# Patient Record
Sex: MALE | Race: WHITE | NOT HISPANIC OR LATINO | Employment: OTHER | URBAN - METROPOLITAN AREA
[De-identification: names, ages, dates, MRNs, and addresses within clinical notes are randomized per-mention and may not be internally consistent; named-entity substitution may affect disease eponyms.]

---

## 2019-05-28 ENCOUNTER — TELEPHONE (OUTPATIENT)
Dept: FAMILY MEDICINE | Facility: CLINIC | Age: 54
End: 2019-05-28

## 2019-05-28 NOTE — TELEPHONE ENCOUNTER
----- Message from Avelino Madera sent at 5/28/2019 12:19 PM CDT -----  Contact: Pt  Pt would like to be called back regarding needing to be seen as a new patient with Dr. Jeff REESE. Pt went online to choose Doctor. Pt will need to have medication refills. Pt has [employer] and waiting insurance card 10 to 14 days.     Pt can be reached at [phone].    Thank You.

## 2019-06-11 ENCOUNTER — OFFICE VISIT (OUTPATIENT)
Dept: FAMILY MEDICINE | Facility: CLINIC | Age: 54
End: 2019-06-11
Payer: COMMERCIAL

## 2019-06-11 ENCOUNTER — LAB VISIT (OUTPATIENT)
Dept: LAB | Facility: HOSPITAL | Age: 54
End: 2019-06-11
Attending: INTERNAL MEDICINE
Payer: COMMERCIAL

## 2019-06-11 VITALS
BODY MASS INDEX: 26.58 KG/M2 | HEIGHT: 66 IN | WEIGHT: 165.38 LBS | RESPIRATION RATE: 16 BRPM | DIASTOLIC BLOOD PRESSURE: 90 MMHG | SYSTOLIC BLOOD PRESSURE: 140 MMHG | OXYGEN SATURATION: 96 % | HEART RATE: 82 BPM

## 2019-06-11 DIAGNOSIS — E78.49 OTHER HYPERLIPIDEMIA: ICD-10-CM

## 2019-06-11 DIAGNOSIS — I10 ESSENTIAL HYPERTENSION: ICD-10-CM

## 2019-06-11 DIAGNOSIS — I10 ESSENTIAL HYPERTENSION: Primary | ICD-10-CM

## 2019-06-11 DIAGNOSIS — Z11.59 ENCOUNTER FOR HEPATITIS C SCREENING TEST FOR LOW RISK PATIENT: ICD-10-CM

## 2019-06-11 DIAGNOSIS — K21.9 GASTROESOPHAGEAL REFLUX DISEASE, ESOPHAGITIS PRESENCE NOT SPECIFIED: ICD-10-CM

## 2019-06-11 LAB
BASOPHILS # BLD AUTO: 0.02 K/UL (ref 0–0.2)
BASOPHILS NFR BLD: 0.3 % (ref 0–1.9)
DIFFERENTIAL METHOD: NORMAL
EOSINOPHIL # BLD AUTO: 0.1 K/UL (ref 0–0.5)
EOSINOPHIL NFR BLD: 0.8 % (ref 0–8)
ERYTHROCYTE [DISTWIDTH] IN BLOOD BY AUTOMATED COUNT: 12.1 % (ref 11.5–14.5)
HCT VFR BLD AUTO: 42.7 % (ref 40–54)
HGB BLD-MCNC: 14.6 G/DL (ref 14–18)
IMM GRANULOCYTES # BLD AUTO: 0.02 K/UL (ref 0–0.04)
IMM GRANULOCYTES NFR BLD AUTO: 0.3 % (ref 0–0.5)
LYMPHOCYTES # BLD AUTO: 1.8 K/UL (ref 1–4.8)
LYMPHOCYTES NFR BLD: 25.5 % (ref 18–48)
MCH RBC QN AUTO: 29.9 PG (ref 27–31)
MCHC RBC AUTO-ENTMCNC: 34.2 G/DL (ref 32–36)
MCV RBC AUTO: 88 FL (ref 82–98)
MONOCYTES # BLD AUTO: 0.8 K/UL (ref 0.3–1)
MONOCYTES NFR BLD: 10.9 % (ref 4–15)
NEUTROPHILS # BLD AUTO: 4.4 K/UL (ref 1.8–7.7)
NEUTROPHILS NFR BLD: 62.2 % (ref 38–73)
NRBC BLD-RTO: 0 /100 WBC
PLATELET # BLD AUTO: 215 K/UL (ref 150–350)
PMV BLD AUTO: 10.5 FL (ref 9.2–12.9)
RBC # BLD AUTO: 4.88 M/UL (ref 4.6–6.2)
WBC # BLD AUTO: 7.13 K/UL (ref 3.9–12.7)

## 2019-06-11 PROCEDURE — 3008F PR BODY MASS INDEX (BMI) DOCUMENTED: ICD-10-PCS | Mod: CPTII,S$GLB,, | Performed by: INTERNAL MEDICINE

## 2019-06-11 PROCEDURE — 99999 PR PBB SHADOW E&M-EST. PATIENT-LVL III: CPT | Mod: PBBFAC,,, | Performed by: INTERNAL MEDICINE

## 2019-06-11 PROCEDURE — 3077F SYST BP >= 140 MM HG: CPT | Mod: CPTII,S$GLB,, | Performed by: INTERNAL MEDICINE

## 2019-06-11 PROCEDURE — 86803 HEPATITIS C AB TEST: CPT

## 2019-06-11 PROCEDURE — 99204 OFFICE O/P NEW MOD 45 MIN: CPT | Mod: S$GLB,,, | Performed by: INTERNAL MEDICINE

## 2019-06-11 PROCEDURE — 3077F PR MOST RECENT SYSTOLIC BLOOD PRESSURE >= 140 MM HG: ICD-10-PCS | Mod: CPTII,S$GLB,, | Performed by: INTERNAL MEDICINE

## 2019-06-11 PROCEDURE — 84443 ASSAY THYROID STIM HORMONE: CPT

## 2019-06-11 PROCEDURE — 99204 PR OFFICE/OUTPT VISIT, NEW, LEVL IV, 45-59 MIN: ICD-10-PCS | Mod: S$GLB,,, | Performed by: INTERNAL MEDICINE

## 2019-06-11 PROCEDURE — 80061 LIPID PANEL: CPT

## 2019-06-11 PROCEDURE — 36415 COLL VENOUS BLD VENIPUNCTURE: CPT | Mod: PO

## 2019-06-11 PROCEDURE — 3078F PR MOST RECENT DIASTOLIC BLOOD PRESSURE < 80 MM HG: ICD-10-PCS | Mod: CPTII,S$GLB,, | Performed by: INTERNAL MEDICINE

## 2019-06-11 PROCEDURE — 85025 COMPLETE CBC W/AUTO DIFF WBC: CPT

## 2019-06-11 PROCEDURE — 3078F DIAST BP <80 MM HG: CPT | Mod: CPTII,S$GLB,, | Performed by: INTERNAL MEDICINE

## 2019-06-11 PROCEDURE — 3008F BODY MASS INDEX DOCD: CPT | Mod: CPTII,S$GLB,, | Performed by: INTERNAL MEDICINE

## 2019-06-11 PROCEDURE — 80053 COMPREHEN METABOLIC PANEL: CPT

## 2019-06-11 PROCEDURE — 99999 PR PBB SHADOW E&M-EST. PATIENT-LVL III: ICD-10-PCS | Mod: PBBFAC,,, | Performed by: INTERNAL MEDICINE

## 2019-06-11 RX ORDER — OMEPRAZOLE 20 MG/1
20 CAPSULE, DELAYED RELEASE ORAL DAILY
COMMUNITY
End: 2019-12-30

## 2019-06-11 RX ORDER — PREGABALIN 150 MG/1
150 CAPSULE ORAL 2 TIMES DAILY
COMMUNITY

## 2019-06-11 RX ORDER — LISINOPRIL 10 MG/1
10 TABLET ORAL DAILY
COMMUNITY
End: 2019-06-11

## 2019-06-11 RX ORDER — LISINOPRIL 20 MG/1
20 TABLET ORAL DAILY
Qty: 90 TABLET | Refills: 3 | Status: SHIPPED | OUTPATIENT
Start: 2019-06-11 | End: 2020-05-25

## 2019-06-11 RX ORDER — ATORVASTATIN CALCIUM 10 MG/1
10 TABLET, FILM COATED ORAL DAILY
Qty: 90 TABLET | Refills: 1 | Status: SHIPPED | OUTPATIENT
Start: 2019-06-11 | End: 2019-12-01 | Stop reason: SDUPTHER

## 2019-06-11 RX ORDER — ATORVASTATIN CALCIUM 10 MG/1
10 TABLET, FILM COATED ORAL DAILY
COMMUNITY
End: 2019-06-11 | Stop reason: SDUPTHER

## 2019-06-11 RX ORDER — TRAMADOL HYDROCHLORIDE 100 MG/1
100 TABLET, EXTENDED RELEASE ORAL DAILY
COMMUNITY

## 2019-06-11 NOTE — PROGRESS NOTES
Patient ID: Maury Mullins is a 54 y.o. male.    Chief Complaint: Establish Care (Patient here to establish care)    HPI  From [place]. Is going through divorce. His sister lives in the area. Living in [place]. 2 grown children. He was working in [employer] business in the past. Is disabled 2/2 back injury s/p surg.     PMH:  Hypertension, hyperlipidemia, GERD, chronic low back pain.   Surg Hx: 2 back surg  FHx: dad has pace maker  Social Hx: no tobacco (previous), no etoh, no illicits    HTN:   Lisinopril 10 mg qd  No CP or JORGENSEN  BP elevated x 2 today   Will increase to 20 mg   Nurse visit in 2 week  Bmp in 2 weeks    HLD:   Atorvastatin 10 mg qd  Check lipids    GERD:   Omeprazole 20 mg qd.   Well controlled.  Continue    Chronic low back with bilateral sciatica:  Tramadol 100 mg qd  pregabalin 150 mg BID  Pain is 5-6/10 today.   Had 2 surgeries (2016 and 2018) both fusions.   No improvement in pain after surg. Sees Dr. Hernandez.   Good control with oral meds.   Monitor      Social History     Socioeconomic History    Marital status: Legally      Spouse name: Not on file    Number of children: Not on file    Years of education: Not on file    Highest education level: Not on file   Occupational History    Not on file   Social Needs    Financial resource strain: Not on file    Food insecurity:     Worry: Not on file     Inability: Not on file    Transportation needs:     Medical: Not on file     Non-medical: Not on file   Tobacco Use    Smoking status: Former Smoker     Last attempt to quit: 1980     Years since quittin.0    Smokeless tobacco: Never Used   Substance and Sexual Activity    Alcohol use: Never     Frequency: Never    Drug use: Never    Sexual activity: Yes     Partners: Female   Lifestyle    Physical activity:     Days per week: Not on file     Minutes per session: Not on file    Stress: Not on file   Relationships    Social connections:     Talks on phone: Not  on file     Gets together: Not on file     Attends Yazidi service: Not on file     Active member of club or organization: Not on file     Attends meetings of clubs or organizations: Not on file     Relationship status: Not on file   Other Topics Concern    Not on file   Social History Narrative    Not on file     No family history on file.  Current Outpatient Medications on File Prior to Visit   Medication Sig Dispense Refill    atorvastatin (LIPITOR) 10 MG tablet Take 10 mg by mouth once daily.      omeprazole (PRILOSEC) 20 MG capsule Take 20 mg by mouth once daily.      pregabalin (LYRICA) 150 MG capsule Take 150 mg by mouth 2 (two) times daily.      traMADol (ULTRAM-ER) 100 MG Tb24 Take 100 mg by mouth once daily.      [DISCONTINUED] lisinopril 10 MG tablet Take 10 mg by mouth once daily.       No current facility-administered medications on file prior to visit.      I personally reviewed past medical, family and social history.  Review of Systems   Constitutional: Negative for activity change, fever and unexpected weight change.   HENT: Negative for facial swelling, hearing loss and trouble swallowing.    Eyes: Negative for visual disturbance.   Respiratory: Negative for cough, chest tightness, shortness of breath and wheezing.    Cardiovascular: Negative for chest pain, palpitations and leg swelling.   Gastrointestinal: Negative for abdominal pain, blood in stool, constipation, diarrhea, nausea and vomiting.   Endocrine: Negative for cold intolerance, polydipsia, polyphagia and polyuria.   Genitourinary: Negative for decreased urine volume and dysuria.   Musculoskeletal: Negative for gait problem and neck pain.   Skin: Negative for rash.   Neurological: Negative for dizziness, syncope and light-headedness.   Psychiatric/Behavioral: Negative for dysphoric mood. The patient is not nervous/anxious.        Objective:     Vitals:    06/11/19 1511   BP: (!) 148/82   Pulse: 82   Resp: 16        Physical Exam    Constitutional: He is oriented to person, place, and time. He appears well-developed and well-nourished. No distress.   HENT:   Head: Normocephalic and atraumatic.   Eyes: Pupils are equal, round, and reactive to light. EOM are normal. Right eye exhibits no discharge. Left eye exhibits no discharge. No scleral icterus.   Neck: Normal range of motion. Neck supple. No JVD present. No thyromegaly present.   Cardiovascular: Normal rate, regular rhythm and normal heart sounds. Exam reveals no gallop and no friction rub.   No murmur heard.  Pulmonary/Chest: Effort normal and breath sounds normal. No respiratory distress. He has no wheezes.   Abdominal: Soft. Bowel sounds are normal. He exhibits no distension and no mass. There is no tenderness.   Musculoskeletal: Normal range of motion. He exhibits no edema.   Lymphadenopathy:     He has no cervical adenopathy.   Neurological: He is alert and oriented to person, place, and time. No cranial nerve deficit. Coordination normal.   Skin: Skin is warm and dry. Capillary refill takes less than 2 seconds. No rash noted. He is not diaphoretic.   Psychiatric: He has a normal mood and affect. His behavior is normal.       Assessment/Plan   Maury was seen today for establish care.    Diagnoses and all orders for this visit:    Encounter for hepatitis C screening test for low risk patient  -     Hepatitis C antibody; Future    Essential hypertension  -     Basic metabolic panel; Future  -     Comprehensive metabolic panel; Future  -     TSH; Future  -     CBC auto differential; Future    Other hyperlipidemia  -     Lipid panel; Future    Gastroesophageal reflux disease, esophagitis presence not specified    Other orders  -     lisinopril (PRINIVIL,ZESTRIL) 20 MG tablet; Take 1 tablet (20 mg total) by mouth once daily.

## 2019-06-12 LAB
ALBUMIN SERPL BCP-MCNC: 4.3 G/DL (ref 3.5–5.2)
ALP SERPL-CCNC: 105 U/L (ref 55–135)
ALT SERPL W/O P-5'-P-CCNC: 17 U/L (ref 10–44)
ANION GAP SERPL CALC-SCNC: 15 MMOL/L (ref 8–16)
AST SERPL-CCNC: 19 U/L (ref 10–40)
BILIRUB SERPL-MCNC: 0.7 MG/DL (ref 0.1–1)
BUN SERPL-MCNC: 13 MG/DL (ref 6–20)
CALCIUM SERPL-MCNC: 10.2 MG/DL (ref 8.7–10.5)
CHLORIDE SERPL-SCNC: 101 MMOL/L (ref 95–110)
CHOLEST SERPL-MCNC: 150 MG/DL (ref 120–199)
CHOLEST/HDLC SERPL: 3.1 {RATIO} (ref 2–5)
CO2 SERPL-SCNC: 24 MMOL/L (ref 23–29)
CREAT SERPL-MCNC: 0.9 MG/DL (ref 0.5–1.4)
EST. GFR  (AFRICAN AMERICAN): >60 ML/MIN/1.73 M^2
EST. GFR  (NON AFRICAN AMERICAN): >60 ML/MIN/1.73 M^2
GLUCOSE SERPL-MCNC: 107 MG/DL (ref 70–110)
HCV AB SERPL QL IA: NEGATIVE
HDLC SERPL-MCNC: 49 MG/DL (ref 40–75)
HDLC SERPL: 32.7 % (ref 20–50)
LDLC SERPL CALC-MCNC: 73.6 MG/DL (ref 63–159)
NONHDLC SERPL-MCNC: 101 MG/DL
POTASSIUM SERPL-SCNC: 3.9 MMOL/L (ref 3.5–5.1)
PROT SERPL-MCNC: 7 G/DL (ref 6–8.4)
SODIUM SERPL-SCNC: 140 MMOL/L (ref 136–145)
TRIGL SERPL-MCNC: 137 MG/DL (ref 30–150)
TSH SERPL DL<=0.005 MIU/L-ACNC: 0.78 UIU/ML (ref 0.4–4)

## 2019-06-25 ENCOUNTER — CLINICAL SUPPORT (OUTPATIENT)
Dept: FAMILY MEDICINE | Facility: CLINIC | Age: 54
End: 2019-06-25
Payer: COMMERCIAL

## 2019-06-25 ENCOUNTER — TELEPHONE (OUTPATIENT)
Dept: FAMILY MEDICINE | Facility: CLINIC | Age: 54
End: 2019-06-25

## 2019-06-25 ENCOUNTER — LAB VISIT (OUTPATIENT)
Dept: LAB | Facility: HOSPITAL | Age: 54
End: 2019-06-25
Attending: INTERNAL MEDICINE
Payer: COMMERCIAL

## 2019-06-25 VITALS — DIASTOLIC BLOOD PRESSURE: 60 MMHG | SYSTOLIC BLOOD PRESSURE: 110 MMHG

## 2019-06-25 DIAGNOSIS — Z01.30 BLOOD PRESSURE CHECK: Primary | ICD-10-CM

## 2019-06-25 DIAGNOSIS — I10 ESSENTIAL HYPERTENSION: ICD-10-CM

## 2019-06-25 LAB
ANION GAP SERPL CALC-SCNC: 8 MMOL/L (ref 8–16)
BUN SERPL-MCNC: 15 MG/DL (ref 6–20)
CALCIUM SERPL-MCNC: 10.2 MG/DL (ref 8.7–10.5)
CHLORIDE SERPL-SCNC: 102 MMOL/L (ref 95–110)
CO2 SERPL-SCNC: 29 MMOL/L (ref 23–29)
CREAT SERPL-MCNC: 0.9 MG/DL (ref 0.5–1.4)
EST. GFR  (AFRICAN AMERICAN): >60 ML/MIN/1.73 M^2
EST. GFR  (NON AFRICAN AMERICAN): >60 ML/MIN/1.73 M^2
GLUCOSE SERPL-MCNC: 77 MG/DL (ref 70–110)
POTASSIUM SERPL-SCNC: 4 MMOL/L (ref 3.5–5.1)
SODIUM SERPL-SCNC: 139 MMOL/L (ref 136–145)

## 2019-06-25 PROCEDURE — 99499 NO LOS: ICD-10-PCS | Mod: S$GLB,,, | Performed by: INTERNAL MEDICINE

## 2019-06-25 PROCEDURE — 36415 COLL VENOUS BLD VENIPUNCTURE: CPT | Mod: PO

## 2019-06-25 PROCEDURE — 80048 BASIC METABOLIC PNL TOTAL CA: CPT

## 2019-06-25 PROCEDURE — 99499 UNLISTED E&M SERVICE: CPT | Mod: S$GLB,,, | Performed by: INTERNAL MEDICINE

## 2019-06-25 NOTE — TELEPHONE ENCOUNTER
Blood pressure improved on increased lisinopril.  Continue current medication.  I would Follow-up BMP done today.

## 2019-06-25 NOTE — PROGRESS NOTES
Maury Mullins 54 y.o. male is here today for Blood Pressure check.   History of HTN yes.    Review of patient's allergies indicates:  No Known Allergies  Creatinine   Date Value Ref Range Status   06/11/2019 0.9 0.5 - 1.4 mg/dL Final     Sodium   Date Value Ref Range Status   06/11/2019 140 136 - 145 mmol/L Final     Potassium   Date Value Ref Range Status   06/11/2019 3.9 3.5 - 5.1 mmol/L Final   ]  Patient verifies taking blood pressure medications on a regular basis at the same time of the day.     Current Outpatient Medications:     atorvastatin (LIPITOR) 10 MG tablet, Take 1 tablet (10 mg total) by mouth once daily., Disp: 90 tablet, Rfl: 1    lisinopril (PRINIVIL,ZESTRIL) 20 MG tablet, Take 1 tablet (20 mg total) by mouth once daily., Disp: 90 tablet, Rfl: 3    omeprazole (PRILOSEC) 20 MG capsule, Take 20 mg by mouth once daily., Disp: , Rfl:     pregabalin (LYRICA) 150 MG capsule, Take 150 mg by mouth 2 (two) times daily., Disp: , Rfl:     traMADol (ULTRAM-ER) 100 MG Tb24, Take 100 mg by mouth once daily., Disp: , Rfl:   Does patient have record of home blood pressure readings no. Readings have been averaging NA.   Last dose of blood pressure medication was taken at AM  Patient is asymptomatic.   Complains of NA.    BP: 110/60 ,   .    Blood pressure reading after 15 minutes was 110/60 pulse NA  Dr. Ruggiero  notified.

## 2019-06-26 ENCOUNTER — PATIENT OUTREACH (OUTPATIENT)
Dept: ADMINISTRATIVE | Facility: HOSPITAL | Age: 54
End: 2019-06-26

## 2019-06-26 NOTE — PROGRESS NOTES
Health Maintenance Due   Topic Date Due    TETANUS VACCINE  04/24/1981    Shingles Vaccine (1 of 2) 04/24/2013    Colonoscopy  04/24/2013     Chart review completed 06/26/2019  Not in Links 06/26/2019  Pre-visit outreach via mail

## 2019-06-26 NOTE — LETTER
June 26, 2019    Maury Mullins  [address] [facility] [address] [facility] [place] [address]             [facility]  [address]  [facility] [address]  Phone: [phone] Dear Mr. Mullins:    [facility] is committed to your overall health.  To help you get the most out of each of your visits, we will review your information to make sure you are up to date on all of your recommended tests and/or procedures.      Dr. Ruggiero   has found that your chart shows you may be due for the following:    Tetanus Immunization  Shingles Immunization  Colon cancer screening    If you have had any of the above done at another facility, please bring the records with you or Fax them to [phone] so that your record at [facility] will be complete. If you have not had any of these tests or procedures done recently and would like to complete this testing ,  please call [phone] or send a message through your [facility] portal to your provider's office.     If you have an upcoming scheduled appointment for the above test and/or procedures, please disregard this letter.    If you are currently taking medication, please bring it with you to your appointment for review.    Sincerely,    Dixie Chinchilla, Care Coordinator  [facility]  Phone: [phone]  Fax: [phone]

## 2019-07-03 ENCOUNTER — TELEPHONE (OUTPATIENT)
Dept: ADMINISTRATIVE | Facility: HOSPITAL | Age: 54
End: 2019-07-03

## 2019-07-03 NOTE — TELEPHONE ENCOUNTER
----- Message from Janelle Arroyo sent at 7/1/2019  1:43 PM CDT -----  Contact: pt  Pt calling about a letter he received in the mail would like a call back,please to discuss it cause she states her has already had the colon screening in 2016...[phone] (home)

## 2019-07-03 NOTE — TELEPHONE ENCOUNTER
Patient states had cscope in [place] and records were given to Dr. Ruggiero.  Please send this report to [facility] team to scan into chart.

## 2019-07-11 ENCOUNTER — LAB VISIT (OUTPATIENT)
Dept: LAB | Facility: HOSPITAL | Age: 54
End: 2019-07-11
Attending: INTERNAL MEDICINE
Payer: COMMERCIAL

## 2019-07-11 ENCOUNTER — OFFICE VISIT (OUTPATIENT)
Dept: FAMILY MEDICINE | Facility: CLINIC | Age: 54
End: 2019-07-11
Payer: COMMERCIAL

## 2019-07-11 VITALS
HEIGHT: 66 IN | SYSTOLIC BLOOD PRESSURE: 120 MMHG | BODY MASS INDEX: 27.25 KG/M2 | DIASTOLIC BLOOD PRESSURE: 78 MMHG | OXYGEN SATURATION: 98 % | WEIGHT: 169.56 LBS | HEART RATE: 79 BPM

## 2019-07-11 DIAGNOSIS — I10 ESSENTIAL HYPERTENSION: Primary | ICD-10-CM

## 2019-07-11 DIAGNOSIS — K21.9 GASTROESOPHAGEAL REFLUX DISEASE, ESOPHAGITIS PRESENCE NOT SPECIFIED: ICD-10-CM

## 2019-07-11 DIAGNOSIS — Z12.5 SCREENING FOR PROSTATE CANCER: ICD-10-CM

## 2019-07-11 DIAGNOSIS — Z23 NEED FOR SHINGLES VACCINE: ICD-10-CM

## 2019-07-11 DIAGNOSIS — E78.49 OTHER HYPERLIPIDEMIA: ICD-10-CM

## 2019-07-11 PROCEDURE — 3074F PR MOST RECENT SYSTOLIC BLOOD PRESSURE < 130 MM HG: ICD-10-PCS | Mod: CPTII,S$GLB,, | Performed by: INTERNAL MEDICINE

## 2019-07-11 PROCEDURE — 3078F PR MOST RECENT DIASTOLIC BLOOD PRESSURE < 80 MM HG: ICD-10-PCS | Mod: CPTII,S$GLB,, | Performed by: INTERNAL MEDICINE

## 2019-07-11 PROCEDURE — 99999 PR PBB SHADOW E&M-EST. PATIENT-LVL III: ICD-10-PCS | Mod: PBBFAC,,, | Performed by: INTERNAL MEDICINE

## 2019-07-11 PROCEDURE — 3008F PR BODY MASS INDEX (BMI) DOCUMENTED: ICD-10-PCS | Mod: CPTII,S$GLB,, | Performed by: INTERNAL MEDICINE

## 2019-07-11 PROCEDURE — 99999 PR PBB SHADOW E&M-EST. PATIENT-LVL III: CPT | Mod: PBBFAC,,, | Performed by: INTERNAL MEDICINE

## 2019-07-11 PROCEDURE — 99214 PR OFFICE/OUTPT VISIT, EST, LEVL IV, 30-39 MIN: ICD-10-PCS | Mod: S$GLB,,, | Performed by: INTERNAL MEDICINE

## 2019-07-11 PROCEDURE — 36415 COLL VENOUS BLD VENIPUNCTURE: CPT | Mod: PO

## 2019-07-11 PROCEDURE — 3008F BODY MASS INDEX DOCD: CPT | Mod: CPTII,S$GLB,, | Performed by: INTERNAL MEDICINE

## 2019-07-11 PROCEDURE — 3078F DIAST BP <80 MM HG: CPT | Mod: CPTII,S$GLB,, | Performed by: INTERNAL MEDICINE

## 2019-07-11 PROCEDURE — 3074F SYST BP LT 130 MM HG: CPT | Mod: CPTII,S$GLB,, | Performed by: INTERNAL MEDICINE

## 2019-07-11 PROCEDURE — 84153 ASSAY OF PSA TOTAL: CPT

## 2019-07-11 PROCEDURE — 99214 OFFICE O/P EST MOD 30 MIN: CPT | Mod: S$GLB,,, | Performed by: INTERNAL MEDICINE

## 2019-07-11 NOTE — PROGRESS NOTES
Subjective:       Patient ID: Maury Mullins is a 54 y.o. male.    Chief Complaint: Hypertension    HPI     From [place]. Is going through divorce. His sister lives in the area. Living in [place]. 2 grown children. 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      PMH:  Hypertension, hyperlipidemia, GERD, chronic low back pain.     Establish care on 06/11/2019.  On that visit ordered routine labs.  Increase lisinopril to 20 mg.  BMP, hepatitis-C, CBC, lipid panel, TSH all normal.  He is here today for follow-up. He is feeling well overall. Has had increased back pain but is going for neck and back injections soon.  He is requesting shingles vaccine.    HTN:   Lisinopril 20 mg qd  No CP or JORGENSEN  Blood pressure at goal today     HLD:   Atorvastatin 10 mg qd  Lipids up-to-date and at goal.  Liver function normal on CMP.     GERD:   Omeprazole 20 mg qd.  No dysphagia   Well controlled.  Continue     Chronic low back with bilateral sciatica:  Had 2 surgeries (2016 and 2018) both fusions.   Tramadol 100 mg qd  pregabalin 150 mg BID  Pain is 7/10 today. States he is going for back and neck injections soon.   No improvement in pain after surg. Sees Dr. Hernandez.   Monitor    Discuss PSA in 6 mo   Needs repeat CSC in 2021.   Current Outpatient Medications on File Prior to Visit   Medication Sig Dispense Refill    atorvastatin (LIPITOR) 10 MG tablet Take 1 tablet (10 mg total) by mouth once daily. 90 tablet 1    lisinopril (PRINIVIL,ZESTRIL) 20 MG tablet Take 1 tablet (20 mg total) by mouth once daily. 90 tablet 3    omeprazole (PRILOSEC) 20 MG capsule Take 20 mg by mouth once daily.      pregabalin (LYRICA) 150 MG capsule Take 150 mg by mouth 2 (two) times daily.      traMADol (ULTRAM-ER) 100 MG Tb24 Take 100 mg by mouth once daily.       No current facility-administered medications on file prior to visit.      I personally reviewed past medical, family and social  history.  Review of Systems   Constitutional: Negative for activity change, fever and unexpected weight change.   HENT: Negative for facial swelling, hearing loss and trouble swallowing.    Eyes: Negative for visual disturbance.   Respiratory: Negative for cough, chest tightness, shortness of breath and wheezing.    Cardiovascular: Negative for chest pain, palpitations and leg swelling.   Gastrointestinal: Negative for abdominal pain, blood in stool, constipation, diarrhea, nausea and vomiting.   Endocrine: Negative for cold intolerance, polydipsia, polyphagia and polyuria.   Genitourinary: Negative for decreased urine volume and dysuria.   Musculoskeletal: Positive for back pain. Negative for gait problem and neck pain.   Skin: Negative for rash.   Neurological: Negative for dizziness, syncope and light-headedness.   Psychiatric/Behavioral: Negative for dysphoric mood. The patient is not nervous/anxious.        Objective:     Vitals:    07/11/19 0942   BP: 120/78   Pulse: 79        Physical Exam   Constitutional: He is oriented to person, place, and time. He appears well-developed and well-nourished. No distress.   HENT:   Head: Normocephalic and atraumatic.   Eyes: Pupils are equal, round, and reactive to light. EOM are normal. Right eye exhibits no discharge. Left eye exhibits no discharge. No scleral icterus.   Neck: Normal range of motion. Neck supple. No JVD present. No thyromegaly present.   Cardiovascular: Normal rate, regular rhythm and normal heart sounds. Exam reveals no gallop and no friction rub.   No murmur heard.  Pulmonary/Chest: Effort normal and breath sounds normal. No respiratory distress. He has no wheezes.   Abdominal: Soft. Bowel sounds are normal. He exhibits no distension and no mass. There is no tenderness.   Musculoskeletal: Normal range of motion. He exhibits no edema.   Lymphadenopathy:     He has no cervical adenopathy.   Neurological: He is alert and oriented to person, place, and  time. No cranial nerve deficit. Coordination normal.   Skin: Skin is warm and dry. Capillary refill takes less than 2 seconds. No rash noted. He is not diaphoretic.   Psychiatric: He has a normal mood and affect. His behavior is normal.         Lab Results   Component Value Date    WBC 7.13 06/11/2019    HGB 14.6 06/11/2019    HCT 42.7 06/11/2019    MCV 88 06/11/2019     06/11/2019      CMP  Sodium   Date Value Ref Range Status   06/25/2019 139 136 - 145 mmol/L Final     Potassium   Date Value Ref Range Status   06/25/2019 4.0 3.5 - 5.1 mmol/L Final     Chloride   Date Value Ref Range Status   06/25/2019 102 95 - 110 mmol/L Final     CO2   Date Value Ref Range Status   06/25/2019 29 23 - 29 mmol/L Final     Glucose   Date Value Ref Range Status   06/25/2019 77 70 - 110 mg/dL Final     BUN, Bld   Date Value Ref Range Status   06/25/2019 15 6 - 20 mg/dL Final     Creatinine   Date Value Ref Range Status   06/25/2019 0.9 0.5 - 1.4 mg/dL Final     Calcium   Date Value Ref Range Status   06/25/2019 10.2 8.7 - 10.5 mg/dL Final     Total Protein   Date Value Ref Range Status   06/11/2019 7.0 6.0 - 8.4 g/dL Final     Albumin   Date Value Ref Range Status   06/11/2019 4.3 3.5 - 5.2 g/dL Final     Total Bilirubin   Date Value Ref Range Status   06/11/2019 0.7 0.1 - 1.0 mg/dL Final     Comment:     For infants and newborns, interpretation of results should be based  on gestational age, weight and in agreement with clinical  observations.  Premature Infant recommended reference ranges:  Up to 24 hours.............<8.0 mg/dL  Up to 48 hours............<12.0 mg/dL  3-5 days..................<15.0 mg/dL  6-29 days.................<15.0 mg/dL       Alkaline Phosphatase   Date Value Ref Range Status   06/11/2019 105 55 - 135 U/L Final     AST   Date Value Ref Range Status   06/11/2019 19 10 - 40 U/L Final     ALT   Date Value Ref Range Status   06/11/2019 17 10 - 44 U/L Final     Anion Gap   Date Value Ref Range Status    06/25/2019 8 8 - 16 mmol/L Final     eGFR if    Date Value Ref Range Status   06/25/2019 >60.0 >60 mL/min/1.73 m^2 Final     eGFR if non    Date Value Ref Range Status   06/25/2019 >60.0 >60 mL/min/1.73 m^2 Final     Comment:     Calculation used to obtain the estimated glomerular filtration  rate (eGFR) is the CKD-EPI equation.         Lab Results   Component Value Date    CHOL 150 06/11/2019     Lab Results   Component Value Date    HDL 49 06/11/2019     Lab Results   Component Value Date    LDLCALC 73.6 06/11/2019     Lab Results   Component Value Date    TRIG 137 06/11/2019     Lab Results   Component Value Date    CHOLHDL 32.7 06/11/2019      Lab Results   Component Value Date    TSH 0.783 06/11/2019       Assessment/Plan   Maury was seen today for hypertension.    Diagnoses and all orders for this visit:    Essential hypertension  -     CBC auto differential; Future  -     Comprehensive metabolic panel; Future    Other hyperlipidemia  -     Lipid panel; Future    Gastroesophageal reflux disease, esophagitis presence not specified    Need for shingles vaccine  -     Cancel: (In Office Administered) Zoster Recombinant Vaccine    Screening for prostate cancer  -     PSA, Screening; Future

## 2019-07-12 LAB — COMPLEXED PSA SERPL-MCNC: 0.34 NG/ML (ref 0–4)

## 2019-09-13 ENCOUNTER — OFFICE VISIT (OUTPATIENT)
Dept: FAMILY MEDICINE | Facility: CLINIC | Age: 54
End: 2019-09-13
Payer: COMMERCIAL

## 2019-09-13 VITALS
OXYGEN SATURATION: 97 % | HEIGHT: 66 IN | SYSTOLIC BLOOD PRESSURE: 126 MMHG | HEART RATE: 84 BPM | BODY MASS INDEX: 27.7 KG/M2 | WEIGHT: 172.38 LBS | DIASTOLIC BLOOD PRESSURE: 82 MMHG

## 2019-09-13 DIAGNOSIS — K21.9 GASTROESOPHAGEAL REFLUX DISEASE, ESOPHAGITIS PRESENCE NOT SPECIFIED: ICD-10-CM

## 2019-09-13 DIAGNOSIS — I10 ESSENTIAL HYPERTENSION: Primary | ICD-10-CM

## 2019-09-13 PROCEDURE — 99213 OFFICE O/P EST LOW 20 MIN: CPT | Mod: S$GLB,,, | Performed by: INTERNAL MEDICINE

## 2019-09-13 PROCEDURE — 3074F PR MOST RECENT SYSTOLIC BLOOD PRESSURE < 130 MM HG: ICD-10-PCS | Mod: CPTII,S$GLB,, | Performed by: INTERNAL MEDICINE

## 2019-09-13 PROCEDURE — 99999 PR PBB SHADOW E&M-EST. PATIENT-LVL III: ICD-10-PCS | Mod: PBBFAC,,, | Performed by: INTERNAL MEDICINE

## 2019-09-13 PROCEDURE — 3074F SYST BP LT 130 MM HG: CPT | Mod: CPTII,S$GLB,, | Performed by: INTERNAL MEDICINE

## 2019-09-13 PROCEDURE — 3008F PR BODY MASS INDEX (BMI) DOCUMENTED: ICD-10-PCS | Mod: CPTII,S$GLB,, | Performed by: INTERNAL MEDICINE

## 2019-09-13 PROCEDURE — 3079F DIAST BP 80-89 MM HG: CPT | Mod: CPTII,S$GLB,, | Performed by: INTERNAL MEDICINE

## 2019-09-13 PROCEDURE — 99213 PR OFFICE/OUTPT VISIT, EST, LEVL III, 20-29 MIN: ICD-10-PCS | Mod: S$GLB,,, | Performed by: INTERNAL MEDICINE

## 2019-09-13 PROCEDURE — 3008F BODY MASS INDEX DOCD: CPT | Mod: CPTII,S$GLB,, | Performed by: INTERNAL MEDICINE

## 2019-09-13 PROCEDURE — 99999 PR PBB SHADOW E&M-EST. PATIENT-LVL III: CPT | Mod: PBBFAC,,, | Performed by: INTERNAL MEDICINE

## 2019-09-13 PROCEDURE — 3079F PR MOST RECENT DIASTOLIC BLOOD PRESSURE 80-89 MM HG: ICD-10-PCS | Mod: CPTII,S$GLB,, | Performed by: INTERNAL MEDICINE

## 2019-09-13 RX ORDER — CYCLOBENZAPRINE HCL 5 MG
TABLET ORAL
COMMUNITY
Start: 2019-09-01

## 2019-09-13 NOTE — PROGRESS NOTES
Subjective:       Patient ID: Maury Mullins is a 54 y.o. male.    Chief Complaint: Sore Throat    HPI    From [place]. Is going through divorce. His sister lives in the area. Living in [place]. 2 grown children. 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     Has been having sore throat. Got back from [place] last week. Sxs started on the trip. Took throat lozenges with some relief. No fevers. Occasional dry cough. Is better today. Tramadol is helping with the pain. Taking tramadol 100 mg daily. Still sees Dr. Hernandez for pain. Last injection was in the beginning of August. BP is well controlled on current lisinopril. GERD is stable omeprazole. He is wanting to get est with GI here. Lipids stable on atorvastatin.      PMH:  Hypertension, hyperlipidemia, GERD, chronic low back pain.       HTN:   Lisinopril   No CP or JORGENSEN  Blood pressure at goal today     HLD:   Atorvastatin   Lipids up-to-date and at goal.  Liver function normal on CMP.     GERD:   Omeprazole       Chronic low back with bilateral sciatica:  Had 2 surgeries (2016 and 2018) both fusions.   Tramadol 100 mg qd  pregabalin 150 mg BID  Pain is 7/10 today. States he is going for back and neck injections soon.   No improvement in pain after surg. Sees Dr. Hernandez.   Monitor  Current Outpatient Medications on File Prior to Visit   Medication Sig Dispense Refill    atorvastatin (LIPITOR) 10 MG tablet Take 1 tablet (10 mg total) by mouth once daily. 90 tablet 1    cyclobenzaprine (FLEXERIL) 5 MG tablet       lisinopril (PRINIVIL,ZESTRIL) 20 MG tablet Take 1 tablet (20 mg total) by mouth once daily. 90 tablet 3    omeprazole (PRILOSEC) 20 MG capsule Take 20 mg by mouth once daily.      pregabalin (LYRICA) 150 MG capsule Take 150 mg by mouth 2 (two) times daily.      traMADol (ULTRAM-ER) 100 MG Tb24 Take 100 mg by mouth once daily.       No current facility-administered medications on file prior to  visit.      I personally reviewed past medical, family and social history.  Review of Systems   Constitutional: Negative for activity change and fever.   HENT: Positive for sore throat. Negative for trouble swallowing.    Eyes: Negative for pain and visual disturbance.   Respiratory: Negative for cough, shortness of breath and wheezing.    Cardiovascular: Negative for chest pain, palpitations and leg swelling.   Gastrointestinal: Negative for abdominal pain, blood in stool, diarrhea, nausea and vomiting.   Endocrine: Negative for cold intolerance and polyuria.   Genitourinary: Negative for decreased urine volume and dysuria.   Musculoskeletal: Negative for gait problem and neck pain.   Skin: Negative for rash.   Neurological: Negative for dizziness, syncope and light-headedness.   Psychiatric/Behavioral: Negative for dysphoric mood. The patient is not nervous/anxious.        Objective:     Vitals:    09/13/19 1117   BP: 126/82   Pulse: 84        Physical Exam   Constitutional: He is oriented to person, place, and time. He appears well-developed and well-nourished. No distress.   HENT:   Head: Normocephalic and atraumatic.   Eyes: Pupils are equal, round, and reactive to light. EOM are normal. Right eye exhibits no discharge. Left eye exhibits no discharge. No scleral icterus.   Neck: Normal range of motion. Neck supple. No JVD present. No thyromegaly present.   Cardiovascular: Normal rate, regular rhythm and normal heart sounds. Exam reveals no gallop and no friction rub.   No murmur heard.  Pulmonary/Chest: Effort normal and breath sounds normal. No respiratory distress. He has no wheezes.   Abdominal: Soft. Bowel sounds are normal. He exhibits no distension and no mass. There is no tenderness.   Musculoskeletal: Normal range of motion. He exhibits no edema.   Lymphadenopathy:     He has no cervical adenopathy.   Neurological: He is alert and oriented to person, place, and time. No cranial nerve deficit.  Coordination normal.   Skin: Skin is warm and dry. Capillary refill takes less than 2 seconds. No rash noted. He is not diaphoretic.   Psychiatric: He has a normal mood and affect. His behavior is normal.         Assessment/Plan   Maury was seen today for sore throat.    Diagnoses and all orders for this visit:    Essential hypertension    Gastroesophageal reflux disease, esophagitis presence not specified  -     Ambulatory referral to Gastroenterology

## 2019-10-11 ENCOUNTER — TELEPHONE (OUTPATIENT)
Dept: FAMILY MEDICINE | Facility: CLINIC | Age: 54
End: 2019-10-11

## 2019-10-11 NOTE — TELEPHONE ENCOUNTER
----- Message from Cirilo Ruggiero DO sent at 10/11/2019  5:06 PM CDT -----  Contact: CECY   Please call patient to see what we need to do, to get his records.   ----- Message -----  From: Antonio Desir MD  Sent: 10/11/2019   1:18 PM CDT  To: Cirilo Ruggiero, DO    I think this message is for you.     ----- Message -----  From: Melinda Hernández  Sent: 10/11/2019   9:13 AM CDT  To: Antonio Desir MD    Please call patient he has been trying to get his medical records since may.

## 2019-12-02 RX ORDER — ATORVASTATIN CALCIUM 10 MG/1
TABLET, FILM COATED ORAL
Qty: 90 TABLET | Refills: 1 | Status: SHIPPED | OUTPATIENT
Start: 2019-12-02 | End: 2020-05-25

## 2019-12-30 ENCOUNTER — LAB VISIT (OUTPATIENT)
Dept: LAB | Facility: HOSPITAL | Age: 54
End: 2019-12-30
Attending: NURSE PRACTITIONER
Payer: COMMERCIAL

## 2019-12-30 ENCOUNTER — OFFICE VISIT (OUTPATIENT)
Dept: FAMILY MEDICINE | Facility: CLINIC | Age: 54
End: 2019-12-30
Payer: COMMERCIAL

## 2019-12-30 ENCOUNTER — OFFICE VISIT (OUTPATIENT)
Dept: GASTROENTEROLOGY | Facility: CLINIC | Age: 54
End: 2019-12-30
Payer: COMMERCIAL

## 2019-12-30 VITALS
HEART RATE: 78 BPM | WEIGHT: 181.88 LBS | HEIGHT: 66 IN | RESPIRATION RATE: 16 BRPM | BODY MASS INDEX: 29.23 KG/M2 | SYSTOLIC BLOOD PRESSURE: 137 MMHG | DIASTOLIC BLOOD PRESSURE: 88 MMHG

## 2019-12-30 VITALS
DIASTOLIC BLOOD PRESSURE: 88 MMHG | OXYGEN SATURATION: 97 % | BODY MASS INDEX: 29.27 KG/M2 | HEART RATE: 88 BPM | HEIGHT: 66 IN | WEIGHT: 182.13 LBS | SYSTOLIC BLOOD PRESSURE: 132 MMHG

## 2019-12-30 DIAGNOSIS — Z80.0 FAMILY HISTORY OF COLON CANCER: ICD-10-CM

## 2019-12-30 DIAGNOSIS — R10.10 UPPER ABDOMINAL PAIN: ICD-10-CM

## 2019-12-30 DIAGNOSIS — Z86.010 HISTORY OF COLON POLYPS: ICD-10-CM

## 2019-12-30 DIAGNOSIS — G89.29 CHRONIC LOW BACK PAIN WITH BILATERAL SCIATICA, UNSPECIFIED BACK PAIN LATERALITY: ICD-10-CM

## 2019-12-30 DIAGNOSIS — K62.89 RECTAL PAIN: ICD-10-CM

## 2019-12-30 DIAGNOSIS — E78.49 OTHER HYPERLIPIDEMIA: ICD-10-CM

## 2019-12-30 DIAGNOSIS — Z87.19 HISTORY OF HEMORRHOIDS: ICD-10-CM

## 2019-12-30 DIAGNOSIS — R12 HEARTBURN: Primary | ICD-10-CM

## 2019-12-30 DIAGNOSIS — M54.41 CHRONIC LOW BACK PAIN WITH BILATERAL SCIATICA, UNSPECIFIED BACK PAIN LATERALITY: ICD-10-CM

## 2019-12-30 DIAGNOSIS — M54.42 CHRONIC LOW BACK PAIN WITH BILATERAL SCIATICA, UNSPECIFIED BACK PAIN LATERALITY: ICD-10-CM

## 2019-12-30 DIAGNOSIS — I10 ESSENTIAL HYPERTENSION: Primary | ICD-10-CM

## 2019-12-30 LAB
ALBUMIN SERPL BCP-MCNC: 4.3 G/DL (ref 3.5–5.2)
ALP SERPL-CCNC: 101 U/L (ref 55–135)
ALT SERPL W/O P-5'-P-CCNC: 27 U/L (ref 10–44)
AST SERPL-CCNC: 24 U/L (ref 10–40)
BILIRUB DIRECT SERPL-MCNC: 0.4 MG/DL (ref 0.1–0.3)
BILIRUB SERPL-MCNC: 0.9 MG/DL (ref 0.1–1)
ERYTHROCYTE [DISTWIDTH] IN BLOOD BY AUTOMATED COUNT: 11.4 % (ref 11.5–14.5)
HCT VFR BLD AUTO: 42.6 % (ref 40–54)
HGB BLD-MCNC: 14.4 G/DL (ref 14–18)
LIPASE SERPL-CCNC: 36 U/L (ref 4–60)
MCH RBC QN AUTO: 29.2 PG (ref 27–31)
MCHC RBC AUTO-ENTMCNC: 33.8 G/DL (ref 32–36)
MCV RBC AUTO: 86 FL (ref 82–98)
PLATELET # BLD AUTO: 196 K/UL (ref 150–350)
PMV BLD AUTO: 10.3 FL (ref 9.2–12.9)
PROT SERPL-MCNC: 7 G/DL (ref 6–8.4)
RBC # BLD AUTO: 4.93 M/UL (ref 4.6–6.2)
WBC # BLD AUTO: 6.09 K/UL (ref 3.9–12.7)

## 2019-12-30 PROCEDURE — 83690 ASSAY OF LIPASE: CPT

## 2019-12-30 PROCEDURE — 3079F PR MOST RECENT DIASTOLIC BLOOD PRESSURE 80-89 MM HG: ICD-10-PCS | Mod: CPTII,S$GLB,, | Performed by: INTERNAL MEDICINE

## 2019-12-30 PROCEDURE — 3008F BODY MASS INDEX DOCD: CPT | Mod: CPTII,S$GLB,, | Performed by: INTERNAL MEDICINE

## 2019-12-30 PROCEDURE — 3008F PR BODY MASS INDEX (BMI) DOCUMENTED: ICD-10-PCS | Mod: CPTII,S$GLB,, | Performed by: NURSE PRACTITIONER

## 2019-12-30 PROCEDURE — 99999 PR PBB SHADOW E&M-EST. PATIENT-LVL III: ICD-10-PCS | Mod: PBBFAC,,, | Performed by: INTERNAL MEDICINE

## 2019-12-30 PROCEDURE — 99214 OFFICE O/P EST MOD 30 MIN: CPT | Mod: S$GLB,,, | Performed by: INTERNAL MEDICINE

## 2019-12-30 PROCEDURE — 3075F SYST BP GE 130 - 139MM HG: CPT | Mod: CPTII,S$GLB,, | Performed by: NURSE PRACTITIONER

## 2019-12-30 PROCEDURE — 85027 COMPLETE CBC AUTOMATED: CPT

## 2019-12-30 PROCEDURE — 99204 OFFICE O/P NEW MOD 45 MIN: CPT | Mod: S$GLB,,, | Performed by: NURSE PRACTITIONER

## 2019-12-30 PROCEDURE — 3008F BODY MASS INDEX DOCD: CPT | Mod: CPTII,S$GLB,, | Performed by: NURSE PRACTITIONER

## 2019-12-30 PROCEDURE — 86677 HELICOBACTER PYLORI ANTIBODY: CPT

## 2019-12-30 PROCEDURE — 3075F SYST BP GE 130 - 139MM HG: CPT | Mod: CPTII,S$GLB,, | Performed by: INTERNAL MEDICINE

## 2019-12-30 PROCEDURE — 3075F PR MOST RECENT SYSTOLIC BLOOD PRESS GE 130-139MM HG: ICD-10-PCS | Mod: CPTII,S$GLB,, | Performed by: INTERNAL MEDICINE

## 2019-12-30 PROCEDURE — 99999 PR PBB SHADOW E&M-EST. PATIENT-LVL V: CPT | Mod: PBBFAC,,, | Performed by: NURSE PRACTITIONER

## 2019-12-30 PROCEDURE — 99999 PR PBB SHADOW E&M-EST. PATIENT-LVL III: CPT | Mod: PBBFAC,,, | Performed by: INTERNAL MEDICINE

## 2019-12-30 PROCEDURE — 80076 HEPATIC FUNCTION PANEL: CPT

## 2019-12-30 PROCEDURE — 99214 PR OFFICE/OUTPT VISIT, EST, LEVL IV, 30-39 MIN: ICD-10-PCS | Mod: S$GLB,,, | Performed by: INTERNAL MEDICINE

## 2019-12-30 PROCEDURE — 36415 COLL VENOUS BLD VENIPUNCTURE: CPT | Mod: PO

## 2019-12-30 PROCEDURE — 3075F PR MOST RECENT SYSTOLIC BLOOD PRESS GE 130-139MM HG: ICD-10-PCS | Mod: CPTII,S$GLB,, | Performed by: NURSE PRACTITIONER

## 2019-12-30 PROCEDURE — 3079F DIAST BP 80-89 MM HG: CPT | Mod: CPTII,S$GLB,, | Performed by: NURSE PRACTITIONER

## 2019-12-30 PROCEDURE — 99999 PR PBB SHADOW E&M-EST. PATIENT-LVL V: ICD-10-PCS | Mod: PBBFAC,,, | Performed by: NURSE PRACTITIONER

## 2019-12-30 PROCEDURE — 3008F PR BODY MASS INDEX (BMI) DOCUMENTED: ICD-10-PCS | Mod: CPTII,S$GLB,, | Performed by: INTERNAL MEDICINE

## 2019-12-30 PROCEDURE — 3079F PR MOST RECENT DIASTOLIC BLOOD PRESSURE 80-89 MM HG: ICD-10-PCS | Mod: CPTII,S$GLB,, | Performed by: NURSE PRACTITIONER

## 2019-12-30 PROCEDURE — 99204 PR OFFICE/OUTPT VISIT, NEW, LEVL IV, 45-59 MIN: ICD-10-PCS | Mod: S$GLB,,, | Performed by: NURSE PRACTITIONER

## 2019-12-30 PROCEDURE — 3079F DIAST BP 80-89 MM HG: CPT | Mod: CPTII,S$GLB,, | Performed by: INTERNAL MEDICINE

## 2019-12-30 RX ORDER — OMEPRAZOLE 40 MG/1
40 CAPSULE, DELAYED RELEASE ORAL EVERY MORNING
Qty: 30 CAPSULE | Refills: 3 | Status: SHIPPED | OUTPATIENT
Start: 2019-12-30 | End: 2020-03-23

## 2019-12-30 RX ORDER — HYDROCORTISONE ACETATE 25 MG/1
25 SUPPOSITORY RECTAL 2 TIMES DAILY PRN
Qty: 24 SUPPOSITORY | Refills: 2 | Status: SHIPPED | OUTPATIENT
Start: 2019-12-30 | End: 2020-01-09

## 2019-12-30 NOTE — PROGRESS NOTES
Subjective:       Patient ID: Maury Mullins is a 54 y.o. male Body mass index is 29.36 kg/m².    Chief Complaint: Gastroesophageal Reflux    This patient is new to me.     Patient reports he moved from [place] in 5/2019 and is here for medication refill for omeprazole. Patient brought cd that contains his prior medical records, which is over 100 pages.    Gastroesophageal Reflux   He complains of heartburn (recently flared up). He reports no abdominal pain, no belching, no chest pain, no choking, no coughing, no dysphagia, no hoarse voice, no nausea or no sore throat. This is a chronic problem. The problem has been gradually worsening (over the past week). The symptoms are aggravated by certain foods (greasy/fried foods). Pertinent negatives include no fatigue, melena or weight loss. Risk factors include smoking/tobacco exposure (former smoker). He has tried a PPI and a diet change (omeprazole 20 mg once daily, zantac 300 mg nightly prn breakthrough symptoms) for the symptoms. Past procedures include an EGD.     Review of Systems   Constitutional: Negative for appetite change, chills, fatigue, fever and weight loss.   HENT: Negative for hoarse voice, sore throat and trouble swallowing.    Respiratory: Negative for cough, choking and shortness of breath.    Cardiovascular: Negative for chest pain.   Gastrointestinal: Positive for constipation (chronic constipation; bowel movements once daily with some straining at times; benefiber once daily restarted recently), heartburn (recently flared up) and rectal pain (had rectal pain in the past and had hernia repair; saw GI in the past for it and told had hemorrhoids; recurred last week; relieved with bowel movement, described as pressure). Negative for abdominal pain, anal bleeding, blood in stool, diarrhea, dysphagia, melena, nausea and vomiting.   Genitourinary: Negative for difficulty urinating, dysuria and flank pain.   Neurological: Negative for weakness.      "  Past Medical History:   Diagnosis Date    Chronic lower back pain     Chronic neck pain     Colon polyp     GERD (gastroesophageal reflux disease)     HTN (hypertension)     Hyperlipidemia     Irritable bowel syndrome      Past Surgical History:   Procedure Laterality Date    COLONOSCOPY  08/15/2016    Dr. Bundy in [place], sent for scanning: hemorrhoids, 3 mm sessile colon polyp, biopsy: tubular adeoma; repeat in 5 years for surveillance    COLONOSCOPY  05/25/2011    Dr. Bundy, sent for scanning: hemorrhoids, random biopsiesl biopsy: negative for microscopic colitis    COLONOSCOPY  05/27/2009    Dr. Bundy, sent for scanning    ESOPHAGEAL MANOMETRY  09/03/2013    "normal Dr. High"    INGUINAL HERNIA REPAIR Right ~2009    UPPER GASTROINTESTINAL ENDOSCOPY  03/09/2016    Dr. Bundy, sent for scanning: mild distal esophageal erythema, biopsy: mild reflux, negative for h pylori, llanos's and celiac    UPPER GASTROINTESTINAL ENDOSCOPY  05/27/2015    Dr. Bundy, sent for scanning    UPPER GASTROINTESTINAL ENDOSCOPY  07/09/2012    Dr. Bundy, sent for scanning    UPPER GASTROINTESTINAL ENDOSCOPY  06/30/2010    Dr. Bundy, sent for scanning     Family History   Problem Relation Age of Onset    Colon cancer Father 90    Colon cancer Maternal Cousin 36    Crohn's disease Neg Hx     Esophageal cancer Neg Hx     Stomach cancer Neg Hx     Ulcerative colitis Neg Hx      Wt Readings from Last 10 Encounters:   12/30/19 82.5 kg (181 lb 14.1 oz)   12/30/19 82.6 kg (182 lb 1.6 oz)   09/13/19 78.2 kg (172 lb 6.4 oz)   07/11/19 76.9 kg (169 lb 8.5 oz)   06/11/19 75 kg (165 lb 5.5 oz)     Lab Results   Component Value Date    WBC 7.13 06/11/2019    HGB 14.6 06/11/2019    HCT 42.7 06/11/2019    MCV 88 06/11/2019     06/11/2019     CMP  Sodium   Date Value Ref Range Status   06/25/2019 139 136 - 145 mmol/L Final     Potassium   Date Value Ref Range Status   06/25/2019 4.0 3.5 - 5.1 mmol/L " Final     Chloride   Date Value Ref Range Status   06/25/2019 102 95 - 110 mmol/L Final     CO2   Date Value Ref Range Status   06/25/2019 29 23 - 29 mmol/L Final     Glucose   Date Value Ref Range Status   06/25/2019 77 70 - 110 mg/dL Final     BUN, Bld   Date Value Ref Range Status   06/25/2019 15 6 - 20 mg/dL Final     Creatinine   Date Value Ref Range Status   06/25/2019 0.9 0.5 - 1.4 mg/dL Final     Calcium   Date Value Ref Range Status   06/25/2019 10.2 8.7 - 10.5 mg/dL Final     Total Protein   Date Value Ref Range Status   06/11/2019 7.0 6.0 - 8.4 g/dL Final     Albumin   Date Value Ref Range Status   06/11/2019 4.3 3.5 - 5.2 g/dL Final     Total Bilirubin   Date Value Ref Range Status   06/11/2019 0.7 0.1 - 1.0 mg/dL Final     Comment:     For infants and newborns, interpretation of results should be based  on gestational age, weight and in agreement with clinical  observations.  Premature Infant recommended reference ranges:  Up to 24 hours.............<8.0 mg/dL  Up to 48 hours............<12.0 mg/dL  3-5 days..................<15.0 mg/dL  6-29 days.................<15.0 mg/dL       Alkaline Phosphatase   Date Value Ref Range Status   06/11/2019 105 55 - 135 U/L Final     AST   Date Value Ref Range Status   06/11/2019 19 10 - 40 U/L Final     ALT   Date Value Ref Range Status   06/11/2019 17 10 - 44 U/L Final     Anion Gap   Date Value Ref Range Status   06/25/2019 8 8 - 16 mmol/L Final     eGFR if    Date Value Ref Range Status   06/25/2019 >60.0 >60 mL/min/1.73 m^2 Final     eGFR if non    Date Value Ref Range Status   06/25/2019 >60.0 >60 mL/min/1.73 m^2 Final     Comment:     Calculation used to obtain the estimated glomerular filtration  rate (eGFR) is the CKD-EPI equation.        Lab Results   Component Value Date    TSH 0.783 06/11/2019     Reviewed medical records received from patient, summarized below and in medical & surgical history (endoscopies, etc), & given  "to nurse to be scanned into system:   2/12/19, 4/25/17, 6/29/16, 2/23/16, 2/10/16, 5/5/15, 10/20/14. 12/27/13, 9/13/13, 8/16/13, 7/26/13, 7/9/13, 5/2/13, 1/3/13, 8/3/12, 7/3/12, 6/2/11, 5/17/11, 1/28/11, 7/9/10, 7/9/10, 5/28/10, 10/23/09 visit notes (PAST TREATMENTS: protonix, dexilant, colace, Citrucel, amitriptyline, analpram-hc)  Colonoscopy, esophageal manometry, and EGD reports  3/1/16 CT "a few small renal cysts otherwise unremarkable" "impression: no evidence of acute abdominal or pelvic abnormality. No lymphoadenopathy, masses or inflamed bowel segments. A few probable small renal cysts"  5/6/11 ct chest abdomen pelvis "impression: findings suspicious for mild sigmoid colitis. 1.8 cm probable lymphocele in region of prior left inguinal hernia repair nonobstructing 2 mm right renal calculus. 4 mm right middle lob pulmonary nodule"  Blood work- see records for full results    Objective:      Physical Exam   Constitutional: He is oriented to person, place, and time. He appears well-developed and well-nourished. No distress.   HENT:   Mouth/Throat: Oropharynx is clear and moist and mucous membranes are normal. No oral lesions. No oropharyngeal exudate.   Eyes: Pupils are equal, round, and reactive to light. Conjunctivae are normal. No scleral icterus.   Pulmonary/Chest: Effort normal and breath sounds normal. No respiratory distress. He has no wheezes.   Abdominal: Soft. Normal appearance and bowel sounds are normal. He exhibits no distension, no abdominal bruit and no mass. There is tenderness (mild) in the right upper quadrant, epigastric area and left upper quadrant. There is no rigidity, no rebound, no guarding, no tenderness at McBurney's point and negative Vides's sign.   Patient declined rectal exam.   Neurological: He is alert and oriented to person, place, and time.   Skin: Skin is warm and dry. No rash noted. He is not diaphoretic. No erythema. No pallor.   Non-jaundiced   Psychiatric: He has a normal " mood and affect. His behavior is normal. Judgment and thought content normal.   Nursing note and vitals reviewed.      Assessment:       1. Heartburn    2. Upper abdominal pain    3. Rectal pain    4. History of hemorrhoids    5. History of colon polyps    6. Family history of colon cancer        Plan:       Heartburn  -  INCREASE TO   omeprazole (PRILOSEC) 40 MG capsule; Take 1 capsule (40 mg total) by mouth every morning.  Dispense: 30 capsule; Refill: 3  - CAN CONTINUE ZANTAC 300 MG DAILY PRN BREAKTHROUGH HEARTBURN  - schedule EGD, discussed procedure with patient, including risks and benefits, patient verbalized understanding  - avoid/minimize use of NSAIDs- since they can cause GI upset, bleeding and/or ulcers. If NSAID must be taken, recommend take with food.    Upper abdominal pain  -     US Abdomen Complete; Future; Expected date: 12/30/2019  -     Lipase; Future; Expected date: 12/30/2019  -     H.Pylori Antibody IgG; Future; Expected date: 12/30/2019  -     Hepatic function panel; Future; Expected date: 01/30/2020  -     CBC Without Differential; Future; Expected date: 12/30/2019  - schedule EGD, discussed procedure with patient, including risks and benefits, patient verbalized understanding    Rectal pain  - RESTART    hydrocortisone (ANUSOL-HC) 25 mg suppository; Place 1 suppository (25 mg total) rectally 2 (two) times daily as needed for Hemorrhoids.  Dispense: 24 suppository; Refill: 2  -     Ambulatory referral to General Surgery  - Discussed about possible diagnostic colonoscopy if symptoms persist; patient verbalized understanding and patient agreed with management plan    History of hemorrhoids  - RESTART    hydrocortisone (ANUSOL-HC) 25 mg suppository; Place 1 suppository (25 mg total) rectally 2 (two) times daily as needed for Hemorrhoids.  Dispense: 24 suppository; Refill: 2  -     Ambulatory referral to General Surgery  - avoid constipation and straining with bowel movements; try using an OTC  stool softener as directed and increase fiber in diet (20-30 grams daily) & CONTINUE OTC fiber supplement, BENEFIBER (take as directed)  - recommend SITZ baths    History of colon polyps & Family history of colon cancer  - next surveillance colonoscopy due 8/2021    Follow up in about 1 month (around 1/30/2020), or if symptoms worsen or fail to improve.      If no improvement in symptoms or symptoms worsen, call/follow-up at clinic or go to ER.

## 2019-12-30 NOTE — PATIENT INSTRUCTIONS
"GERD (Adult)    The esophagus is a tube that carries food from the mouth to the stomach. A valve at the lower end of the esophagus prevents stomach acid from flowing upward. When this valve doesn't work properly, stomach contents may repeatedly flow back up (reflux) into the esophagus. This is called gastroesophageal reflux disease (GERD). GERD can irritate the esophagus. It can cause problems with swallowing or breathing. In severe cases, GERD can cause recurrent pneumonia or other serious problems.  Symptoms of reflux include burning, pressure or sharp pain in the upper abdomen or mid to lower chest. The pain can spread to the neck, back, or shoulder. There may be belching, an acid taste in the back of the throat, chronic cough, or sore throat or hoarseness. GERD symptoms often occur during the day after a big meal. They can also occur at night when lying down.   Home care  Lifestyle changes can help reduce symptoms. If needed, medicines may be prescribed. Symptoms often improve with treatment, but if treatment is stopped, the symptoms often return after a few months. So most persons with GERD will need to continue treatment.  Lifestyle changes  · Limit or avoid fatty, fried, and spicy foods, as well as coffee, chocolate, mint, and foods with high acid content such as tomatoes and citrus fruit and juices (orange, grapefruit, lemon).  · Dont eat large meals, especially at night. Frequent, smaller meals are best. Do not lie down right after eating. And dont eat anything 3 hours before going to bed.  · Avoid drinking alcohol and smoking. As much as possible, stay away from second hand smoke.  · If you are overweight, losing weight will reduce symptoms.   · Avoid wearing tight clothing around your stomach area.  · If your symptoms occur during sleep, use a foam wedge to elevate your upper body (not just your head.) Or, place 4" blocks under the head of your bed.  Medicines  If needed, medicines can help relieve the " symptoms of GERD and prevent damage to the esophagus. Discuss a medicine plan with your healthcare provider. This may include one or more of the following medicines:  · Antacids to help neutralize the normal acids in your stomach.  · Acid blockers (H2 blockers) to decrease acid production.  · Acid inhibitors (PPIs) to decrease acid production in a different way than the blockers. They may work better, but can take a little longer to take effect.  Take an antacid 30-60 minutes after eating and at bedtime, but not at the same time as an acid blocker.  Try not to take medicines such as ibuprofen and aspirin. If you are taking aspirin for your heart or other medical reasons, talk to your healthcare provider about stopping it.  Follow-up care  Follow up with your healthcare provider or as advised by our staff.  When to seek medical advice  Call your healthcare provider if any of the following occur:  · Stomach pain gets worse or moves to the lower right abdomen (appendix area)  · Chest pain appears or gets worse, or spreads to the back, neck, shoulder, or arm  · Frequent vomiting (cant keep down liquids)  · Blood in the stool or vomit (red or black in color)  · Feeling weak or dizzy  · Fever of 100.4ºF (38ºC) or higher, or as directed by your healthcare provider  Date Last Reviewed: 6/23/2015 © 2000-2017 [email or web address]. [address] [place] [address], [place] [address]. All rights reserved. This information is not intended as a substitute for professional medical care. Always follow your healthcare professional's instructions.            Hemorrhoids    Hemorrhoids are swollen and inflamed veins inside the rectum and near the anus. The rectum is the last several inches of the colon. The anus is the passage between the rectum and the outside of the body.  Causes  The veins can become swollen due to increased pressure in them. This is most often caused by:  · Chronic constipation or diarrhea  · Straining when having a  bowel movement  · Sitting too long on the toilet  · A low-fiber diet  · Pregnancy  Symptoms  · Bleeding from the rectum (this may be noticeable after bowel movements)  · Lump near the anus  · Itching around the anus  · Pain around the anus  There are different types of hemorrhoids. Depending on the type you have and the severity, you may be able to treat yourself at home. In some cases, a procedure may be the best treatment option. Your healthcare provider can tell you more about this, if needed.  Home care  General care  · To get relief from pain or itching, try:  ? Topical products. Your healthcare provider may prescribe or recommend creams, ointments, or pads that can be applied to the hemorrhoid. Use these exactly as directed.  ? Medicines. Your healthcare provider may recommend stool softeners, suppositories, or laxatives to help manage constipation. Use these exactly as directed.  ? Sitz baths. A sitz bath involves sitting in a few inches of warm bath water. Be careful not to make the water so hot that you burn yourself--test it before sitting in it. Soak for about 10 to 15 minutes a few times a day. This may help relieve pain.  Tips to help prevent hemorrhoids  · Eat more fiber. Fiber adds bulk to stool and absorbs water as it moves through your colon. This makes stool softer and easier to pass.  ? Increase the fiber in your diet with more fiber-rich foods. These include fresh fruit, vegetables, and whole grains.  ? Take a fiber supplement or bulking agent, if advised to by your provider. These include products such as psyllium or methylcellulose.  · Drink plenty of water, if directed to by your provider. This can help keep stool soft.  · Be more active. Frequent exercise aids digestion and helps prevent constipation. It may also help make bowel movements more regular.  · Dont strain during bowel movements. This can make hemorrhoids more likely. Also, dont sit on the toilet for long periods of  time.  Follow-up care  Follow up with your healthcare provider, or as advised. If a culture or imaging tests were done, you will be notified of the results when they are ready. This may take a few days or longer.  When to seek medical advice  Call your healthcare provider right away if any of these occur:  · Increased bleeding from the rectum  · Increased pain around the rectum or anus  · Weakness or dizziness  Call 911  Call 911 or return to the emergency department right away if any of these occur:  · Trouble breathing or swallowing  · Fainting or loss of consciousness  · Unusually fast heart rate  · Vomiting blood  · Large amounts of blood in stool  Date Last Reviewed: 6/22/2015  © 1928-4034 "[employer]. [address] [place] [address], [place] [address]. All rights reserved. This information is not intended as a substitute for professional medical care. Always follow your healthcare professional's instructions.

## 2019-12-30 NOTE — Clinical Note
Please inform patient that I reviewed medical records and recommend to continue with current management plan and recommend seeing general surgery for further evaluation and management of hemorrhoids (referral placed). Next surveillance colonoscopy is due 8/2021.Thanks,JASIEL

## 2019-12-30 NOTE — PROGRESS NOTES
Subjective:       Patient ID: Maury Mullins is a 54 y.o. male.    Chief Complaint: Hypertension and Rectal Pain    HPI    From [place]. Is going through divorce. His sister lives in the area. Living in [place]. 2 grown children. 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     Here today for follow-up. Has been having rectal pain and tenesmus. Denies blood in stool, States he had this in the past. Colonoscopy showed internal hemorrhoids per patient. He is seeing GI today for GERD. States next CSC is 2021. He is back to taking benifiber with some result. States stool is very hard at times (isabella at times). States colace helped in the past. He would like to have prostate checked. KARL: prostate slightly enlarged but symmetric, no overt masses.   -continue bene fiber.   -trial colace nightly.     Past medical history includes:  1.  Hypertension:  Lisinopril. Stable.   2.  Hyperlipidemia:  Atorvastatin. Stable.   3.  GERD:  Omeprazole. Uncontrolled, discussing with GI today.   4.  Chronic low back pain with bilateral sciatica:  Tramadol and pregabalin. Had 2 surgeries (2016 and 2018) both fusions.  Sees pain management Dr. Hernandez. Yasmeen.   5.  Screening for colon cancer:  Last colonoscopy 01/01/2016.  Next due 2021.     -lipid, CMP, CBC ordered for 01/11/2020  Current Outpatient Medications on File Prior to Visit   Medication Sig Dispense Refill    atorvastatin (LIPITOR) 10 MG tablet TAKE 1 TABLET BY MOUTH EVERY DAY 90 tablet 1    cyclobenzaprine (FLEXERIL) 5 MG tablet       lisinopril (PRINIVIL,ZESTRIL) 20 MG tablet Take 1 tablet (20 mg total) by mouth once daily. 90 tablet 3    omeprazole (PRILOSEC) 20 MG capsule Take 20 mg by mouth once daily.      pregabalin (LYRICA) 150 MG capsule Take 150 mg by mouth 2 (two) times daily.      traMADol (ULTRAM-ER) 100 MG Tb24 Take 100 mg by mouth once daily.      AFLURIA QD 2019-20,3YR UP,,PF, 60 mcg (15 mcg x 4)/0.5 mL  Zohreh        No current facility-administered medications on file prior to visit.      I personally reviewed past medical, family and social history.  Review of Systems    Objective:     Vitals:    12/30/19 1127   BP: 132/88   Pulse: 88        Physical Exam   Constitutional: He is oriented to person, place, and time. He appears well-developed and well-nourished. No distress.   HENT:   Head: Normocephalic and atraumatic.   Eyes: Pupils are equal, round, and reactive to light. EOM are normal. Right eye exhibits no discharge. Left eye exhibits no discharge. No scleral icterus.   Neck: Normal range of motion. Neck supple. No JVD present. No thyromegaly present.   Cardiovascular: Normal rate, regular rhythm and normal heart sounds. Exam reveals no gallop and no friction rub.   No murmur heard.  Pulmonary/Chest: Effort normal and breath sounds normal. No respiratory distress. He has no wheezes.   Abdominal: Soft. Bowel sounds are normal. He exhibits no distension and no mass. There is no tenderness.   Genitourinary: Rectum normal.   Genitourinary Comments: Prostate slightly enlarged.    Musculoskeletal: Normal range of motion. He exhibits no edema.   Lymphadenopathy:     He has no cervical adenopathy.   Neurological: He is alert and oriented to person, place, and time. No cranial nerve deficit. Coordination normal.   Skin: Skin is warm and dry. Capillary refill takes less than 2 seconds. No rash noted. He is not diaphoretic.   Psychiatric: He has a normal mood and affect. His behavior is normal.         Assessment/Plan   Maury was seen today for hypertension and rectal pain.    Diagnoses and all orders for this visit:    Essential hypertension    Other hyperlipidemia    Chronic low back pain with bilateral sciatica, unspecified back pain laterality

## 2020-01-02 ENCOUNTER — TELEPHONE (OUTPATIENT)
Dept: GASTROENTEROLOGY | Facility: CLINIC | Age: 55
End: 2020-01-02

## 2020-01-02 LAB — H PYLORI IGG SERPL QL IA: NEGATIVE

## 2020-01-02 NOTE — TELEPHONE ENCOUNTER
----- Message from QASIM Connolly sent at 1/2/2020 10:54 AM CST -----  Please inform patient that I reviewed medical records and recommend to continue with current management plan and recommend seeing general surgery for further evaluation and management of hemorrhoids (referral placed). Next surveillance colonoscopy is due 8/2021.  Thanks,  JASIEL

## 2020-01-02 NOTE — TELEPHONE ENCOUNTER
Informed pt that Vashti Yoon NP reviewed his medical records and reccomends that he see general surgery for further management and evaluatio of his hemorrhoids. Advised pt to call and make an appt with general surgery. Pt verbalized understanding. Also informed pt that his next surveillance colonoscopy is due 8/2021. Pt verbalized understanding.

## 2020-01-03 ENCOUNTER — TELEPHONE (OUTPATIENT)
Dept: GASTROENTEROLOGY | Facility: CLINIC | Age: 55
End: 2020-01-03

## 2020-01-03 NOTE — TELEPHONE ENCOUNTER
Spoke with pt. Informed of results & recommendations per FRITZ Yoon NP. Pt verbalized understanding.

## 2020-01-03 NOTE — TELEPHONE ENCOUNTER
Please call to inform & review the results with the patient- blood work showed negative for h pylori, normal pancreatic enzyme, unremarkable blood counts, and liver function levels were overall unremarkable with only a mild elevation in direct bilirubin. This can be monitored by your PCP.    Continue with previous recommendations. If no improvement in symptoms or symptoms worsen, call/follow-up at clinic or go to ER.  Please release results to patient's mychart once you have discussed results and recommendations with patient.  Thanks,

## 2020-01-09 ENCOUNTER — HOSPITAL ENCOUNTER (OUTPATIENT)
Dept: RADIOLOGY | Facility: HOSPITAL | Age: 55
Discharge: HOME | End: 2020-01-09
Attending: NURSE PRACTITIONER
Payer: COMMERCIAL

## 2020-01-09 DIAGNOSIS — R10.10 UPPER ABDOMINAL PAIN: ICD-10-CM

## 2020-01-09 PROCEDURE — 76700 US EXAM ABDOM COMPLETE: CPT | Mod: 26,,, | Performed by: RADIOLOGY

## 2020-01-09 PROCEDURE — 76700 US EXAM ABDOM COMPLETE: CPT | Mod: TC,PO

## 2020-01-09 PROCEDURE — 76700 US ABDOMEN COMPLETE: ICD-10-PCS | Mod: 26,,, | Performed by: RADIOLOGY

## 2020-01-10 ENCOUNTER — TELEPHONE (OUTPATIENT)
Dept: GASTROENTEROLOGY | Facility: CLINIC | Age: 55
End: 2020-01-10

## 2020-01-10 NOTE — TELEPHONE ENCOUNTER
"Please call to inform & review the results with the patient- radiology report of the abdominal ultrasound showed no acute findings. "There is a 6 mm nonobstructing calculus in the upper pole of the right kidney which is otherwise normal.  The pancreas, gallbladder, liver, left kidney and spleen are normal."  Recommend follow-up with Primary Care Provider for continued evaluation and management of kidney stone.    Continue with previous recommendations. If no improvement in symptoms or symptoms worsen, call/follow-up at clinic or go to ER.  Please release results to patient's mychart once you have discussed results and recommendations with patient.  Thanks,        "

## 2020-01-13 ENCOUNTER — OFFICE VISIT (OUTPATIENT)
Dept: FAMILY MEDICINE | Facility: CLINIC | Age: 55
End: 2020-01-13
Payer: COMMERCIAL

## 2020-01-13 ENCOUNTER — TELEPHONE (OUTPATIENT)
Dept: FAMILY MEDICINE | Facility: CLINIC | Age: 55
End: 2020-01-13

## 2020-01-13 VITALS
DIASTOLIC BLOOD PRESSURE: 78 MMHG | HEART RATE: 89 BPM | WEIGHT: 181.88 LBS | OXYGEN SATURATION: 97 % | BODY MASS INDEX: 29.36 KG/M2 | SYSTOLIC BLOOD PRESSURE: 134 MMHG

## 2020-01-13 DIAGNOSIS — Z13.220 SCREENING FOR LIPID DISORDERS: ICD-10-CM

## 2020-01-13 DIAGNOSIS — K21.9 GASTROESOPHAGEAL REFLUX DISEASE, ESOPHAGITIS PRESENCE NOT SPECIFIED: ICD-10-CM

## 2020-01-13 DIAGNOSIS — K64.8 INTERNAL HEMORRHOIDS: Primary | ICD-10-CM

## 2020-01-13 DIAGNOSIS — I10 ESSENTIAL HYPERTENSION: Primary | ICD-10-CM

## 2020-01-13 DIAGNOSIS — Z87.19 HISTORY OF HEMORRHOIDS: ICD-10-CM

## 2020-01-13 DIAGNOSIS — Z12.5 SCREENING FOR PROSTATE CANCER: ICD-10-CM

## 2020-01-13 DIAGNOSIS — K59.09 CHRONIC CONSTIPATION: ICD-10-CM

## 2020-01-13 PROCEDURE — 99999 PR PBB SHADOW E&M-EST. PATIENT-LVL III: CPT | Mod: PBBFAC,,, | Performed by: INTERNAL MEDICINE

## 2020-01-13 PROCEDURE — 3008F PR BODY MASS INDEX (BMI) DOCUMENTED: ICD-10-PCS | Mod: CPTII,S$GLB,, | Performed by: INTERNAL MEDICINE

## 2020-01-13 PROCEDURE — 3075F SYST BP GE 130 - 139MM HG: CPT | Mod: CPTII,S$GLB,, | Performed by: INTERNAL MEDICINE

## 2020-01-13 PROCEDURE — 3078F PR MOST RECENT DIASTOLIC BLOOD PRESSURE < 80 MM HG: ICD-10-PCS | Mod: CPTII,S$GLB,, | Performed by: INTERNAL MEDICINE

## 2020-01-13 PROCEDURE — 99214 OFFICE O/P EST MOD 30 MIN: CPT | Mod: S$GLB,,, | Performed by: INTERNAL MEDICINE

## 2020-01-13 PROCEDURE — 3075F PR MOST RECENT SYSTOLIC BLOOD PRESS GE 130-139MM HG: ICD-10-PCS | Mod: CPTII,S$GLB,, | Performed by: INTERNAL MEDICINE

## 2020-01-13 PROCEDURE — 99999 PR PBB SHADOW E&M-EST. PATIENT-LVL III: ICD-10-PCS | Mod: PBBFAC,,, | Performed by: INTERNAL MEDICINE

## 2020-01-13 PROCEDURE — 3078F DIAST BP <80 MM HG: CPT | Mod: CPTII,S$GLB,, | Performed by: INTERNAL MEDICINE

## 2020-01-13 PROCEDURE — 99214 PR OFFICE/OUTPT VISIT, EST, LEVL IV, 30-39 MIN: ICD-10-PCS | Mod: S$GLB,,, | Performed by: INTERNAL MEDICINE

## 2020-01-13 PROCEDURE — 3008F BODY MASS INDEX DOCD: CPT | Mod: CPTII,S$GLB,, | Performed by: INTERNAL MEDICINE

## 2020-01-13 NOTE — PROGRESS NOTES
Subjective:       Patient ID: Maury Mullins is a 54 y.o. male.    Chief Complaint: Follow-up    HPI    From [place]. Is going through divorce. His sister lives in the area. Living in [place]. 2 grown children. 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     On last visit patient was having constipation, Colace added.  KARL showed enlarged prostate but no masses. He saw a GI that day as well.  Omeprazole increased to 40 mg, hydrocortisone suppositories prescribed, colonoscopy recommended per schedule. He tried preparation H because suppositories RXed took expensive. Has been taking colace, eating fruits and veggies, drinks enough water.      Past medical history includes:  1.  Hypertension:  Lisinopril.  --> BP at goal.   2.  Hyperlipidemia:  Atorvastatin.   --> lipids at goal.   3.  GERD:  Omeprazole and ranitidine.  --> improved on 40 mg.   4.  Chronic low back pain with bilateral sciatica:  Tramadol and pregabalin. Had 2 surgeries (2016 and 2018) both fusions.  Sees pain management Dr. Hernandez.   --> Stable.   5.  Screening for colon cancer:  Last colonoscopy 01/01/2016.  Next due 2021.   6.  Hemorrhoids:  --> Discuss with GI. Increase colace.     -CBC, CMP, TSH, PSA in July  -lipid panel in January 2021  Current Outpatient Medications on File Prior to Visit   Medication Sig Dispense Refill    AFLURIA QD 2019-20,3YR UP,,PF, 60 mcg (15 mcg x 4)/0.5 mL Syrg       atorvastatin (LIPITOR) 10 MG tablet TAKE 1 TABLET BY MOUTH EVERY DAY 90 tablet 1    BENEFIBER, WHEAT DEXTRIN, ORAL Take by mouth once daily.      cyclobenzaprine (FLEXERIL) 5 MG tablet       lisinopril (PRINIVIL,ZESTRIL) 20 MG tablet Take 1 tablet (20 mg total) by mouth once daily. 90 tablet 3    omeprazole (PRILOSEC) 40 MG capsule Take 1 capsule (40 mg total) by mouth every morning. 30 capsule 3    pregabalin (LYRICA) 150 MG capsule Take 150 mg by mouth 2 (two) times daily.      ranitidine  (ZANTAC) 300 MG tablet Take 300 mg by mouth nightly as needed for Heartburn.      traMADol (ULTRAM-ER) 100 MG Tb24 Take 100 mg by mouth once daily.       No current facility-administered medications on file prior to visit.      I personally reviewed past medical, family and social history.  Review of Systems   Constitutional: Negative for activity change and fever.   HENT: Negative for sore throat and trouble swallowing.    Eyes: Negative for pain and visual disturbance.   Respiratory: Negative for cough, shortness of breath and wheezing.    Cardiovascular: Negative for chest pain, palpitations and leg swelling.   Gastrointestinal: Positive for rectal pain. Negative for abdominal pain, blood in stool, diarrhea, nausea and vomiting.   Endocrine: Negative for cold intolerance and polyuria.   Genitourinary: Negative for decreased urine volume and dysuria.   Musculoskeletal: Negative for gait problem and neck pain.   Skin: Negative for rash.   Neurological: Negative for dizziness, syncope and light-headedness.   Psychiatric/Behavioral: Negative for dysphoric mood. The patient is not nervous/anxious.        Objective:     Vitals:    01/13/20 0748   BP: 134/78   Pulse: 89        Physical Exam   Constitutional: He is oriented to person, place, and time. He appears well-developed and well-nourished. No distress.   HENT:   Head: Normocephalic and atraumatic.   Eyes: Pupils are equal, round, and reactive to light. EOM are normal. Right eye exhibits no discharge. Left eye exhibits no discharge. No scleral icterus.   Neck: Normal range of motion. Neck supple. No JVD present. No thyromegaly present.   Cardiovascular: Normal rate, regular rhythm and normal heart sounds. Exam reveals no gallop and no friction rub.   No murmur heard.  Pulmonary/Chest: Effort normal and breath sounds normal. No respiratory distress. He has no wheezes.   Abdominal: Soft. Bowel sounds are normal. He exhibits no distension and no mass. There is no  tenderness.   Musculoskeletal: Normal range of motion. He exhibits no edema.   Lymphadenopathy:     He has no cervical adenopathy.   Neurological: He is alert and oriented to person, place, and time. No cranial nerve deficit. Coordination normal.   Skin: Skin is warm and dry. Capillary refill takes less than 2 seconds. No rash noted. He is not diaphoretic.   Psychiatric: He has a normal mood and affect. His behavior is normal.         Assessment/Plan   Maury was seen today for follow-up.    Diagnoses and all orders for this visit:    Essential hypertension  -     CBC auto differential; Future  -     Comprehensive metabolic panel; Future  -     TSH; Future  -     Lipid panel; Future    Gastroesophageal reflux disease, esophagitis presence not specified  -     CBC auto differential; Future  -     Comprehensive metabolic panel; Future    Chronic constipation  -     CBC auto differential; Future  -     Comprehensive metabolic panel; Future    History of hemorrhoids    Screening for prostate cancer  -     PSA, Screening; Future    Screening for lipid disorders  -     Lipid panel; Future

## 2020-01-13 NOTE — TELEPHONE ENCOUNTER
Spoke with pt and informed of results and recommendations per Capri Yoon NP. Pt verbalized understanding.

## 2020-01-14 NOTE — TELEPHONE ENCOUNTER
Discussed with GI nurse practitioner.  GI providers do not do banding for internal hemorrhoids.  Will refer to General surgery as suggested by GI.

## 2020-01-23 ENCOUNTER — OFFICE VISIT (OUTPATIENT)
Dept: SURGERY | Facility: CLINIC | Age: 55
End: 2020-01-23
Payer: COMMERCIAL

## 2020-01-23 VITALS
DIASTOLIC BLOOD PRESSURE: 74 MMHG | BODY MASS INDEX: 29.65 KG/M2 | SYSTOLIC BLOOD PRESSURE: 139 MMHG | HEIGHT: 66 IN | HEART RATE: 77 BPM | TEMPERATURE: 97 F | WEIGHT: 184.5 LBS

## 2020-01-23 DIAGNOSIS — K64.9 HEMORRHOIDS, UNSPECIFIED HEMORRHOID TYPE: Primary | ICD-10-CM

## 2020-01-23 PROCEDURE — 99999 PR PBB SHADOW E&M-EST. PATIENT-LVL III: CPT | Mod: PBBFAC,,, | Performed by: SURGERY

## 2020-01-23 PROCEDURE — 99243 PR OFFICE CONSULTATION,LEVEL III: ICD-10-PCS | Mod: 25,S$GLB,, | Performed by: SURGERY

## 2020-01-23 PROCEDURE — 46600 PR DIAG2STIC A2SCOPY: ICD-10-PCS | Mod: S$GLB,,, | Performed by: SURGERY

## 2020-01-23 PROCEDURE — 46600 DIAGNOSTIC ANOSCOPY SPX: CPT | Mod: S$GLB,,, | Performed by: SURGERY

## 2020-01-23 PROCEDURE — 99999 PR PBB SHADOW E&M-EST. PATIENT-LVL III: ICD-10-PCS | Mod: PBBFAC,,, | Performed by: SURGERY

## 2020-01-23 PROCEDURE — 99243 OFF/OP CNSLTJ NEW/EST LOW 30: CPT | Mod: 25,S$GLB,, | Performed by: SURGERY

## 2020-01-23 NOTE — Clinical Note
I saw your patient, Maury Mullins in the office.  Attached are my findings and plan.  Thank you for referring him to my office and if you have any questions please do not hesitate to call my cell [phone].Bang Cast

## 2020-01-23 NOTE — LETTER
January 24, 2020      Cirilo Ruggiero,   [address] [facility] [address] [place] [address]           [facility]  [address] [place] [address]  Phone: [phone]          Patient: Maury Mullins   MR Number: [id number]   YOB: 1965   Date of Visit: 1/23/2020       Dear Dr. Cirilo Ruggiero:    Thank you for referring Maury Mullins to me for evaluation. Attached you will find relevant portions of my assessment and plan of care.    If you have questions, please do not hesitate to call me. I look forward to following Maury Mullins along with you.    Sincerely,    Mike Cast MD    Enclosure  CC:  No Recipients    If you would like to receive this communication electronically, please contact [email or web address] or [phone] to request more information on [employer] Link access.    For providers and/or their staff who would like to refer a patient to [facility], please contact us through our one-stop-shop provider referral line, [facility], at [phone].    If you feel you have received this communication in error or would no longer like to receive these types of communications, please e-mail [email or web address]

## 2020-01-29 ENCOUNTER — TELEPHONE (OUTPATIENT)
Dept: GASTROENTEROLOGY | Facility: CLINIC | Age: 55
End: 2020-01-29

## 2020-01-29 NOTE — TELEPHONE ENCOUNTER
----- Message from Sergey Lundy sent at 1/29/2020  1:34 PM CST -----  Contact: Patient  Type: Needs Medical Advice    Who Called:  Patient  Best Call Back Number: [phone]  Additional Information: Patient would like to cancel upcoming procedure. Please call to advise. Thanks!

## 2020-01-29 NOTE — TELEPHONE ENCOUNTER
Spoke with pt. Pt states he will be out of the state for scheduled EGD & will call back to reschedule.   EGD canceled per pt request.

## 2020-02-18 ENCOUNTER — IMMUNIZATION (OUTPATIENT)
Dept: PHARMACY | Facility: CLINIC | Age: 55
End: 2020-02-18
Payer: COMMERCIAL

## 2020-03-22 ENCOUNTER — TELEPHONE (OUTPATIENT)
Dept: GASTROENTEROLOGY | Facility: CLINIC | Age: 55
End: 2020-03-22

## 2020-03-22 DIAGNOSIS — R12 HEARTBURN: ICD-10-CM

## 2020-03-23 RX ORDER — OMEPRAZOLE 40 MG/1
CAPSULE, DELAYED RELEASE ORAL
Qty: 90 CAPSULE | Refills: 0 | Status: SHIPPED | OUTPATIENT
Start: 2020-03-23 | End: 2020-06-22

## 2020-03-23 NOTE — TELEPHONE ENCOUNTER
Please inform the patient that I refilled the prescription for prilosec and patient is due for a follow-up visit in 1-2 months for continued evaluation and management.  Thanks,  JASIEL

## 2020-03-23 NOTE — TELEPHONE ENCOUNTER
Spoke with pt. Informed of message per Capri Yoon NP. Pt verbalized understanding. Pt did not wish to schedule at this time.

## 2020-04-21 ENCOUNTER — IMMUNIZATION (OUTPATIENT)
Dept: PHARMACY | Facility: CLINIC | Age: 55
End: 2020-04-21
Payer: COMMERCIAL

## 2020-05-01 ENCOUNTER — OFFICE VISIT (OUTPATIENT)
Dept: FAMILY MEDICINE | Facility: CLINIC | Age: 55
End: 2020-05-01
Payer: COMMERCIAL

## 2020-05-01 DIAGNOSIS — M79.601 RIGHT ARM PAIN: Primary | ICD-10-CM

## 2020-05-01 PROCEDURE — 99214 PR OFFICE/OUTPT VISIT, EST, LEVL IV, 30-39 MIN: ICD-10-PCS | Mod: 95,,, | Performed by: INTERNAL MEDICINE

## 2020-05-01 PROCEDURE — 99214 OFFICE O/P EST MOD 30 MIN: CPT | Mod: 95,,, | Performed by: INTERNAL MEDICINE

## 2020-05-01 RX ORDER — MELOXICAM 15 MG/1
15 TABLET ORAL EVERY OTHER DAY
Qty: 15 TABLET | Refills: 0 | Status: SHIPPED | OUTPATIENT
Start: 2020-05-01

## 2020-05-01 NOTE — PROGRESS NOTES
Audio Only Telehealth Visit     The patient location is: [place]  The chief complaint leading to consultation is: right arm pain    Time spent with patient: 15 min   Visit type: Virtual visit with audio only (telephone)     The reason for the audio only service rather than synchronous audio and video virtual visit was related to technical difficulties or patient preference/necessity.     Each patient to whom I provide medical services by telemedicine is:  (1) informed of the relationship between the physician and patient and the respective role of any other health care provider with respect to management of the patient; and (2) notified that they may decline to receive medical services by telemedicine and may withdraw from such care at any time. Patient verbally consented to receive this service via voice-only telephone call.    This service was not originating from a related E/M service provided within the previous 7 days nor will  to an E/M service or procedure within the next 24 hours or my soonest available appointment.  Prevailing standard of care was able to be met in this audio-only visit.        Social Hx:  From [place]. Is going through divorce. His sister lives in the area. Living in [place]. 2 grown children. He was working in [employer] business in the past. Is disabled 2/2 back injury s/p surg.     HPI:  Right arm has been hurting. 2 week hx. Pain is located in upper arm. Hurts when pushing on muscle. Activity makes it worse. He is wondering if neck could have contribution. Neck does hurt. He denies weakness in the arm.  He sees pain management. He has an appointment with them soon. He takes lyrica, tramadol, flexeril.   -will trial meloxicam    PMH and A/P  Problem  Assessment Plan Hx/Notes   Hypertension      Hyperlipidemia Controlled January 2020     GERD      Chronic low back pain with bilateral sciatica               Health Maintenance:      Current Outpatient Medications on File Prior  to Visit   Medication Sig Dispense Refill    AFLURIA QD 2019-20,3YR UP,,PF, 60 mcg (15 mcg x 4)/0.5 mL Syrg       atorvastatin (LIPITOR) 10 MG tablet TAKE 1 TABLET BY MOUTH EVERY DAY 90 tablet 1    BENEFIBER, WHEAT DEXTRIN, ORAL Take by mouth once daily.      cyclobenzaprine (FLEXERIL) 5 MG tablet       lisinopril (PRINIVIL,ZESTRIL) 20 MG tablet Take 1 tablet (20 mg total) by mouth once daily. 90 tablet 3    omeprazole (PRILOSEC) 40 MG capsule TAKE 1 CAPSULE BY MOUTH EVERY DAY IN THE MORNING 90 capsule 0    pregabalin (LYRICA) 150 MG capsule Take 150 mg by mouth 2 (two) times daily.      ranitidine (ZANTAC) 300 MG tablet Take 300 mg by mouth nightly as needed for Heartburn.      traMADol (ULTRAM-ER) 100 MG Tb24 Take 100 mg by mouth once daily.       No current facility-administered medications on file prior to visit.      I personally reviewed past medical, family and social history.  Review of Systems   Constitutional: Negative for activity change and fever.   HENT: Negative for sore throat and trouble swallowing.    Eyes: Negative for pain and visual disturbance.   Respiratory: Negative for cough, shortness of breath and wheezing.    Cardiovascular: Negative for chest pain, palpitations and leg swelling.   Gastrointestinal: Negative for abdominal pain, blood in stool, diarrhea, nausea and vomiting.   Endocrine: Negative for cold intolerance and polyuria.   Genitourinary: Negative for decreased urine volume and dysuria.   Musculoskeletal: Negative for gait problem and neck pain.        Arm pain    Skin: Negative for rash.   Neurological: Negative for dizziness, syncope and light-headedness.   Psychiatric/Behavioral: Negative for dysphoric mood. The patient is not nervous/anxious.          Assessment/Plan   Diagnoses and all orders for this visit:    Right arm pain  -     meloxicam (MOBIC) 15 MG tablet; Take 1 tablet (15 mg total) by mouth every other day.

## 2020-05-25 RX ORDER — ATORVASTATIN CALCIUM 10 MG/1
TABLET, FILM COATED ORAL
Qty: 90 TABLET | Refills: 1 | Status: SHIPPED | OUTPATIENT
Start: 2020-05-25 | End: 2020-11-14 | Stop reason: SDUPTHER

## 2020-05-25 RX ORDER — LISINOPRIL 20 MG/1
TABLET ORAL
Qty: 90 TABLET | Refills: 1 | Status: SHIPPED | OUTPATIENT
Start: 2020-05-25 | End: 2020-10-28

## 2020-06-03 ENCOUNTER — OFFICE VISIT (OUTPATIENT)
Dept: FAMILY MEDICINE | Facility: CLINIC | Age: 55
End: 2020-06-03
Payer: COMMERCIAL

## 2020-06-03 VITALS
DIASTOLIC BLOOD PRESSURE: 74 MMHG | WEIGHT: 182.31 LBS | OXYGEN SATURATION: 96 % | TEMPERATURE: 98 F | BODY MASS INDEX: 29.3 KG/M2 | HEIGHT: 66 IN | SYSTOLIC BLOOD PRESSURE: 122 MMHG | HEART RATE: 83 BPM

## 2020-06-03 DIAGNOSIS — Z12.5 SCREENING FOR PROSTATE CANCER: ICD-10-CM

## 2020-06-03 DIAGNOSIS — I10 ESSENTIAL HYPERTENSION: Primary | ICD-10-CM

## 2020-06-03 DIAGNOSIS — M77.11 RIGHT LATERAL EPICONDYLITIS: ICD-10-CM

## 2020-06-03 PROCEDURE — 3074F SYST BP LT 130 MM HG: CPT | Mod: CPTII,S$GLB,, | Performed by: INTERNAL MEDICINE

## 2020-06-03 PROCEDURE — 99999 PR PBB SHADOW E&M-EST. PATIENT-LVL III: CPT | Mod: PBBFAC,,, | Performed by: INTERNAL MEDICINE

## 2020-06-03 PROCEDURE — 99214 OFFICE O/P EST MOD 30 MIN: CPT | Mod: S$GLB,,, | Performed by: INTERNAL MEDICINE

## 2020-06-03 PROCEDURE — 3078F PR MOST RECENT DIASTOLIC BLOOD PRESSURE < 80 MM HG: ICD-10-PCS | Mod: CPTII,S$GLB,, | Performed by: INTERNAL MEDICINE

## 2020-06-03 PROCEDURE — 3008F PR BODY MASS INDEX (BMI) DOCUMENTED: ICD-10-PCS | Mod: CPTII,S$GLB,, | Performed by: INTERNAL MEDICINE

## 2020-06-03 PROCEDURE — 3078F DIAST BP <80 MM HG: CPT | Mod: CPTII,S$GLB,, | Performed by: INTERNAL MEDICINE

## 2020-06-03 PROCEDURE — 99999 PR PBB SHADOW E&M-EST. PATIENT-LVL III: ICD-10-PCS | Mod: PBBFAC,,, | Performed by: INTERNAL MEDICINE

## 2020-06-03 PROCEDURE — 3074F PR MOST RECENT SYSTOLIC BLOOD PRESSURE < 130 MM HG: ICD-10-PCS | Mod: CPTII,S$GLB,, | Performed by: INTERNAL MEDICINE

## 2020-06-03 PROCEDURE — 3008F BODY MASS INDEX DOCD: CPT | Mod: CPTII,S$GLB,, | Performed by: INTERNAL MEDICINE

## 2020-06-03 PROCEDURE — 99214 PR OFFICE/OUTPT VISIT, EST, LEVL IV, 30-39 MIN: ICD-10-PCS | Mod: S$GLB,,, | Performed by: INTERNAL MEDICINE

## 2020-06-03 NOTE — PROGRESS NOTES
Subjective:       Patient ID: Maury Mullins is a 55 y.o. male.    Chief Complaint: Hyperlipidemia (1 month f/u) and Hypertension    Social Hx:  From [place]. Is going through divorce. His sister lives in the area. Living in [place]. 2 grown children. 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      HPI:  Past medical history includes hypertension, hyperlipidemia, GERD, chronic low back pain with bilateral sciatica.    Weight stable. Doing well overall. States he walks in am and pm. He is still having right elbow pain. Meloxicam did help. He did get injections in past. Likely right lateral epicondylitis.   -ref PMR. Continue meloxicam.     A/P:  1.  Hypertension: controlled, cont lisinopril  2.  Hyperlipidemia:  Controlled January 2020.  Continue statin  3.  Chronic low back pain: stable.   4.  GERD: controlled.   Current Outpatient Medications on File Prior to Visit   Medication Sig Dispense Refill    atorvastatin (LIPITOR) 10 MG tablet TAKE 1 TABLET BY MOUTH EVERY DAY 90 tablet 1    BENEFIBER, WHEAT DEXTRIN, ORAL Take by mouth once daily.      cyclobenzaprine (FLEXERIL) 5 MG tablet       lisinopriL (PRINIVIL,ZESTRIL) 20 MG tablet TAKE 1 TABLET BY MOUTH EVERY DAY 90 tablet 1    meloxicam (MOBIC) 15 MG tablet Take 1 tablet (15 mg total) by mouth every other day. 15 tablet 0    omeprazole (PRILOSEC) 40 MG capsule TAKE 1 CAPSULE BY MOUTH EVERY DAY IN THE MORNING 90 capsule 0    pregabalin (LYRICA) 150 MG capsule Take 150 mg by mouth 2 (two) times daily.      traMADol (ULTRAM-ER) 100 MG Tb24 Take 100 mg by mouth once daily.      AFLURIA QD 2019-20,3YR UP,,PF, 60 mcg (15 mcg x 4)/0.5 mL Syrg       [DISCONTINUED] ranitidine (ZANTAC) 300 MG tablet Take 300 mg by mouth nightly as needed for Heartburn.       No current facility-administered medications on file prior to visit.      I personally reviewed past medical, family and social history.  Review of Systems    Constitutional: Negative for activity change and fever.   HENT: Negative for sore throat and trouble swallowing.    Eyes: Negative for pain and visual disturbance.   Respiratory: Negative for cough, shortness of breath and wheezing.    Cardiovascular: Negative for chest pain, palpitations and leg swelling.   Gastrointestinal: Negative for abdominal pain, blood in stool, diarrhea, nausea and vomiting.   Endocrine: Negative for cold intolerance and polyuria.   Genitourinary: Negative for decreased urine volume and dysuria.   Musculoskeletal: Negative for gait problem and neck pain.        Right arm pain    Skin: Negative for rash.   Neurological: Negative for dizziness, syncope and light-headedness.   Psychiatric/Behavioral: Negative for dysphoric mood. The patient is not nervous/anxious.          Objective:     Vitals:    06/03/20 0945   BP: 122/74   Pulse: 83   Temp: 98.1 °F (36.7 °C)        Physical Exam   Constitutional: He is oriented to person, place, and time. He appears well-developed. No distress.   HENT:   Head: Normocephalic and atraumatic.   Eyes: Pupils are equal, round, and reactive to light. EOM are normal.   Neck: Neck supple. No thyromegaly present.   Cardiovascular: Normal rate, regular rhythm and normal heart sounds. Exam reveals no gallop and no friction rub.   No murmur heard.  Pulmonary/Chest: Effort normal and breath sounds normal. No respiratory distress. He has no wheezes.   Abdominal: Soft. Bowel sounds are normal. There is no tenderness.   Musculoskeletal: He exhibits no edema.   Tenderness or right forearm extensors with resisted forearm extension.    Neurological: He is alert and oriented to person, place, and time. No cranial nerve deficit.   Skin: Skin is warm. No rash noted.   Psychiatric: He has a normal mood and affect. His behavior is normal.         Assessment/Plan   Maury was seen today for hyperlipidemia and hypertension.    Diagnoses and all orders for this  visit:    Essential hypertension    Screening for prostate cancer    Right lateral epicondylitis  -     Ambulatory referral/consult to Physical Medicine Rehab; Future    Other orders  -     Cancel: PSA, Screening; Future  -     Cancel: Comprehensive metabolic panel; Future

## 2020-06-04 ENCOUNTER — PATIENT OUTREACH (OUTPATIENT)
Dept: ADMINISTRATIVE | Facility: OTHER | Age: 55
End: 2020-06-04

## 2020-06-08 ENCOUNTER — OFFICE VISIT (OUTPATIENT)
Dept: PHYSICAL MEDICINE AND REHAB | Facility: CLINIC | Age: 55
End: 2020-06-08
Payer: COMMERCIAL

## 2020-06-08 VITALS — WEIGHT: 182 LBS | BODY MASS INDEX: 29.25 KG/M2 | HEIGHT: 66 IN

## 2020-06-08 DIAGNOSIS — M77.11 RIGHT LATERAL EPICONDYLITIS: ICD-10-CM

## 2020-06-08 PROCEDURE — 76882 PR  US,EXTREMITY,NONVASCULAR,REAL-TIME IMAGE,LIMITED: ICD-10-PCS | Mod: S$GLB,,, | Performed by: PHYSICAL MEDICINE & REHABILITATION

## 2020-06-08 PROCEDURE — 20551 NJX 1 TENDON ORIGIN/INSJ: CPT | Mod: RT,S$GLB,, | Performed by: PHYSICAL MEDICINE & REHABILITATION

## 2020-06-08 PROCEDURE — 99243 OFF/OP CNSLTJ NEW/EST LOW 30: CPT | Mod: 25,S$GLB,, | Performed by: PHYSICAL MEDICINE & REHABILITATION

## 2020-06-08 PROCEDURE — 99999 PR PBB SHADOW E&M-EST. PATIENT-LVL III: CPT | Mod: PBBFAC,,, | Performed by: PHYSICAL MEDICINE & REHABILITATION

## 2020-06-08 PROCEDURE — 20551 TENDON ORIGIN: R ELBOW: ICD-10-PCS | Mod: RT,S$GLB,, | Performed by: PHYSICAL MEDICINE & REHABILITATION

## 2020-06-08 PROCEDURE — 99243 PR OFFICE CONSULTATION,LEVEL III: ICD-10-PCS | Mod: 25,S$GLB,, | Performed by: PHYSICAL MEDICINE & REHABILITATION

## 2020-06-08 PROCEDURE — 76942 TENDON ORIGIN: R ELBOW: ICD-10-PCS | Mod: 26,59,S$GLB, | Performed by: PHYSICAL MEDICINE & REHABILITATION

## 2020-06-08 PROCEDURE — 99999 PR PBB SHADOW E&M-EST. PATIENT-LVL III: ICD-10-PCS | Mod: PBBFAC,,, | Performed by: PHYSICAL MEDICINE & REHABILITATION

## 2020-06-08 PROCEDURE — 76882 US LMTD JT/FCL EVL NVASC XTR: CPT | Mod: S$GLB,,, | Performed by: PHYSICAL MEDICINE & REHABILITATION

## 2020-06-08 PROCEDURE — 76942 ECHO GUIDE FOR BIOPSY: CPT | Mod: 26,59,S$GLB, | Performed by: PHYSICAL MEDICINE & REHABILITATION

## 2020-06-08 RX ORDER — BETAMETHASONE SODIUM PHOSPHATE AND BETAMETHASONE ACETATE 3; 3 MG/ML; MG/ML
6 INJECTION, SUSPENSION INTRA-ARTICULAR; INTRALESIONAL; INTRAMUSCULAR; SOFT TISSUE
Status: DISCONTINUED | OUTPATIENT
Start: 2020-06-08 | End: 2020-06-08 | Stop reason: HOSPADM

## 2020-06-08 RX ADMIN — BETAMETHASONE SODIUM PHOSPHATE AND BETAMETHASONE ACETATE 6 MG: 3; 3 INJECTION, SUSPENSION INTRA-ARTICULAR; INTRALESIONAL; INTRAMUSCULAR; SOFT TISSUE at 10:06

## 2020-06-08 NOTE — PROCEDURES
Tendon Origin: R elbow  Date/Time: 6/8/2020 10:30 AM  Performed by: Bay Valles MD  Authorized by: Bay Valles MD     Consent Done?:  Yes (Verbal)  Timeout: prior to procedure the correct patient, procedure, and site was verified    Indications:  Pain  Site marked: the procedure site was marked    Timeout: prior to procedure the correct patient, procedure, and site was verified    Location:  Elbow  Site:  R elbow  Prep: patient was prepped and draped in usual sterile fashion    Ultrasonic Guidance for Needle Placement?: Yes    Needle size:  25 G  Approach: Needle in plane, distal to proximal.  Medications:  6 mg betamethasone acetate-betamethasone sodium phosphate 6 mg/mL  Patient tolerance:  Patient tolerated the procedure well with no immediate complications   Ultrasound guidance was used for correct needle placement, the images were saved will be uploaded to EMR.

## 2020-06-08 NOTE — LETTER
June 8, 2020      Cirilo Ruggiero, DO  [address] [facility] [address] [place] [address]           [facility]  [address] [place] [address]  Phone: [phone]  Fax: [phone]          Patient: Maury Mullins   MR Number: [id number]   YOB: 1965   Date of Visit: 6/8/2020       Dear Dr. Cirilo Ruggiero:    Thank you for referring Maury Mullins to me for evaluation. Attached you will find relevant portions of my assessment and plan of care.    If you have questions, please do not hesitate to call me. I look forward to following Maury Mullins along with you.    Sincerely,    Bay Valles MD    Enclosure  CC:  No Recipients    If you would like to receive this communication electronically, please contact [email or web address] or [phone] to request more information on [employer] Link access.    For providers and/or their staff who would like to refer a patient to [facility], please contact us through our one-stop-shop provider referral line, [facility], at [phone].    If you feel you have received this communication in error or would no longer like to receive these types of communications, please e-mail [email or web address]

## 2020-06-08 NOTE — PROGRESS NOTES
[facility]    Consulting Provider: Dr. Cirilo Ruggiero    CHIEF COMPLAINT:   Chief Complaint   Patient presents with    Elbow Pain     Right      HISTORY OF PRESENT ILLNESS: Maury Mullins is a 55 y.o. male who presents to me as a new patient for evaluation of his right elbow. RHD and recently moved to [place] from [place]. Known diagnosis is right lateral epicondylitis which was treated with 2 steroid injections over 1 year ago while living in [place]. Reports 100% relief from the 2nd injection. Complaint today is recurrent R lateral elbow pain x 1 month. Denies trauma or injury. Pain is similar in location and nature as it was prior to his injection. Pain is worse with full elbow extension. Denies numbness and tingling. Was previously taking Mobic for this complaint as prescribed by Dr. Ruggiero with some relief. Otherwise, no additional treatment.     Review of Systems   Constitutional: Negative for fever.   HENT: Negative for drooling.    Eyes: Negative for discharge.   Respiratory: Negative for choking.    Cardiovascular: Negative for chest pain.   Genitourinary: Negative for flank pain.   Skin: Negative for wound.   Allergic/Immunologic: Negative for immunocompromised state.   Neurological: Negative for tremors and syncope.   Psychiatric/Behavioral: Negative for behavioral problems.     Past Medical History:   Past Medical History:   Diagnosis Date    Chronic lower back pain     Chronic neck pain     Colon polyp     GERD (gastroesophageal reflux disease)     HTN (hypertension)     Hyperlipidemia     Irritable bowel syndrome        Past Surgical History:   Past Surgical History:   Procedure Laterality Date    COLONOSCOPY  08/15/2016    Dr. Bundy in [place], sent for scanning: hemorrhoids, 3 mm sessile colon polyp, biopsy: tubular adeoma; repeat in 5 years for surveillance    COLONOSCOPY  05/25/2011    Dr. Bundy, sent for scanning: hemorrhoids, random biopsiesl  "biopsy: negative for microscopic colitis    COLONOSCOPY  05/27/2009    Dr. Bundy, sent for scanning    ESOPHAGEAL MANOMETRY  09/03/2013    "normal Dr. High"    INGUINAL HERNIA REPAIR Right ~2009    UPPER GASTROINTESTINAL ENDOSCOPY  03/09/2016    Dr. Bundy, sent for scanning: mild distal esophageal erythema, biopsy: mild reflux, negative for h pylori, llanos's and celiac    UPPER GASTROINTESTINAL ENDOSCOPY  05/27/2015    Dr. Bundy, sent for scanning    UPPER GASTROINTESTINAL ENDOSCOPY  07/09/2012    Dr. Bundy, sent for scanning    UPPER GASTROINTESTINAL ENDOSCOPY  06/30/2010    Dr. Bundy, sent for scanning       Family History:   Family History   Problem Relation Age of Onset    Colon cancer Father 90    Colon cancer Maternal Cousin 36    Crohn's disease Neg Hx     Esophageal cancer Neg Hx     Stomach cancer Neg Hx     Ulcerative colitis Neg Hx        Medications:   Current Outpatient Medications on File Prior to Visit   Medication Sig Dispense Refill    AFLURIA QD 2019-20,3YR UP,,PF, 60 mcg (15 mcg x 4)/0.5 mL Syrg       atorvastatin (LIPITOR) 10 MG tablet TAKE 1 TABLET BY MOUTH EVERY DAY 90 tablet 1    BENEFIBER, WHEAT DEXTRIN, ORAL Take by mouth once daily.      cyclobenzaprine (FLEXERIL) 5 MG tablet       lisinopriL (PRINIVIL,ZESTRIL) 20 MG tablet TAKE 1 TABLET BY MOUTH EVERY DAY 90 tablet 1    meloxicam (MOBIC) 15 MG tablet Take 1 tablet (15 mg total) by mouth every other day. 15 tablet 0    omeprazole (PRILOSEC) 40 MG capsule TAKE 1 CAPSULE BY MOUTH EVERY DAY IN THE MORNING 90 capsule 0    pregabalin (LYRICA) 150 MG capsule Take 150 mg by mouth 2 (two) times daily.      traMADol (ULTRAM-ER) 100 MG Tb24 Take 100 mg by mouth once daily.       No current facility-administered medications on file prior to visit.        Allergies: Review of patient's allergies indicates:  No Known Allergies    Social History:   Social History     Socioeconomic History    Marital status: " "Legally      Spouse name: Not on file    Number of children: Not on file    Years of education: Not on file    Highest education level: Not on file   Occupational History    Not on file   Social Needs    Financial resource strain: Not on file    Food insecurity:     Worry: Not on file     Inability: Not on file    Transportation needs:     Medical: Not on file     Non-medical: Not on file   Tobacco Use    Smoking status: Former Smoker     Last attempt to quit: 1980     Years since quittin.0    Smokeless tobacco: Never Used   Substance and Sexual Activity    Alcohol use: Never     Frequency: Never    Drug use: Never    Sexual activity: Yes     Partners: Female   Lifestyle    Physical activity:     Days per week: Not on file     Minutes per session: Not on file    Stress: Not on file   Relationships    Social connections:     Talks on phone: Not on file     Gets together: Not on file     Attends Islam service: Not on file     Active member of club or organization: Not on file     Attends meetings of clubs or organizations: Not on file     Relationship status: Not on file   Other Topics Concern    Not on file   Social History Narrative    Not on file     PHYSICAL EXAMINATION:   General    Vitals:    20 1026   Weight: 82.6 kg (182 lb)   Height: 5' 6" (1.676 m)     Constitutional: Oriented to person, place, and time. No apparent distress. Pleasant.  HENT:   Head: Normocephalic and atraumatic.   Eyes: Right eye exhibits no discharge. Left eye exhibits no discharge. No scleral icterus.   Pulmonary/Chest: Effort normal. No respiratory distress.   Abdominal: There is no guarding.   Neurological: Alert and oriented to person, place, and time.   Psychiatric: Behavior is normal.   Right Elbow Exam     Tenderness   The patient is experiencing no tenderness (Tender along the forearm musculature).     Range of Motion   Extension: 0   Flexion: 140     Muscle Strength   Pronation:  5/5 "   Supination:  5/5     Tests   Varus: negative  Valgus: negative  Tinel's sign (cubital tunnel): negative    Other   Erythema: absent  Scars: absent  Sensation: normal  Pulse: present    Comments:  Pain with resisted wrist extension.   Positive Cozen's for diffuse pain.         INSPECTION: There is no swelling, ecchymoses, erythema or gross deformity of the elbow.  GAIT/DYNAMIC: Normal      Imaging:  Diagnostic Ultrasound:  Limited diagnostic ultrasound was performed today in the right lateral elbow.  The images were saved will be uploaded to EMR.  The common extensor tendon was observed attachment on the lateral condyle humerus.  There is no gapping in the tendon substance that would suggest significant tearing.  There was no lateral elbow joint effusion.  The radial collateral ligament appear to be heterogenous at the proximal attachment upon the humerus.  This could relate to history of partial tearing.  There was a moderate degree of cortical irregularity of the lateral condyle humerus which could also related to a history of chronic tendinosis.  There is no significant hyperemia or neovascularization seen within the tendon on color Doppler function.  The proximal portion of the common extensor tendon was somewhat tender to sonopalpation.    Data Reviewed: Diagnostic Ultrasound    Supportive Actions: Independent visualization of images or test specimens    ASSESSMENT:   1. Right lateral epicondylitis      PLAN:     1. Time was spent reviewing the above diagnosis in depth with Maury today, including acute management and rehabilitation.  His symptoms and physical exam are suggestive of lateral epicondylosis.  He also reports significant reduction in his symptoms following previous injections of corticosteroid, although his pain is return.  Diagnostic ultrasound exam today of the common extensor tendon did show some degree of pathology in the form of chronic tendinosis.  He also may have some history of previous  "injury to the radial collateral ligament.  We discussed additional conservative measures to include a repeat corticosteroid injection, PRP, Tenex as well as bracing and physical therapy.     2.  We discussed the potential deleterious effects of repeated injections of corticosteroid, especially considering his pain continues to return after previous injections.  After discussion he would like to repeat this injection as opposed to starting formal physical therapy or considering PRP or Tenex.  See separate procedure note for ultrasound-guided injection.      3. RTC as needed.     This is a consult from Dr. Cirilo Ruggiero. Please see the "Communications" section of Epic to see how the consulting physician received the report of today's findings and recommendations. If it's an [facility] physician, it will be forwarded to his/her "in basket".    The above note was completed, in part, with the aid of Dragon dictation software/hardware. Translation errors may be present.    "

## 2020-07-06 ENCOUNTER — LAB VISIT (OUTPATIENT)
Dept: LAB | Facility: HOSPITAL | Age: 55
End: 2020-07-06
Attending: INTERNAL MEDICINE
Payer: COMMERCIAL

## 2020-07-06 DIAGNOSIS — I10 ESSENTIAL HYPERTENSION: ICD-10-CM

## 2020-07-06 DIAGNOSIS — K59.09 CHRONIC CONSTIPATION: ICD-10-CM

## 2020-07-06 DIAGNOSIS — K21.9 GASTROESOPHAGEAL REFLUX DISEASE, ESOPHAGITIS PRESENCE NOT SPECIFIED: ICD-10-CM

## 2020-07-06 DIAGNOSIS — Z12.5 SCREENING FOR PROSTATE CANCER: ICD-10-CM

## 2020-07-06 LAB
ALBUMIN SERPL BCP-MCNC: 4.1 G/DL (ref 3.5–5.2)
ALP SERPL-CCNC: 105 U/L (ref 55–135)
ALT SERPL W/O P-5'-P-CCNC: 33 U/L (ref 10–44)
ANION GAP SERPL CALC-SCNC: 5 MMOL/L (ref 8–16)
AST SERPL-CCNC: 24 U/L (ref 10–40)
BILIRUB SERPL-MCNC: 0.7 MG/DL (ref 0.1–1)
BUN SERPL-MCNC: 15 MG/DL (ref 6–20)
CALCIUM SERPL-MCNC: 9.3 MG/DL (ref 8.7–10.5)
CHLORIDE SERPL-SCNC: 105 MMOL/L (ref 95–110)
CO2 SERPL-SCNC: 30 MMOL/L (ref 23–29)
COMPLEXED PSA SERPL-MCNC: 0.42 NG/ML (ref 0–4)
CREAT SERPL-MCNC: 1.1 MG/DL (ref 0.5–1.4)
EST. GFR  (AFRICAN AMERICAN): >60 ML/MIN/1.73 M^2
EST. GFR  (NON AFRICAN AMERICAN): >60 ML/MIN/1.73 M^2
GLUCOSE SERPL-MCNC: 91 MG/DL (ref 70–110)
POTASSIUM SERPL-SCNC: 3.9 MMOL/L (ref 3.5–5.1)
PROT SERPL-MCNC: 7.1 G/DL (ref 6–8.4)
SODIUM SERPL-SCNC: 140 MMOL/L (ref 136–145)

## 2020-07-06 PROCEDURE — 80053 COMPREHEN METABOLIC PANEL: CPT

## 2020-07-06 PROCEDURE — 84153 ASSAY OF PSA TOTAL: CPT

## 2020-07-06 PROCEDURE — 36415 COLL VENOUS BLD VENIPUNCTURE: CPT | Mod: PO

## 2020-09-12 ENCOUNTER — TELEPHONE (OUTPATIENT)
Dept: GASTROENTEROLOGY | Facility: CLINIC | Age: 55
End: 2020-09-12

## 2020-09-12 DIAGNOSIS — R12 HEARTBURN: ICD-10-CM

## 2020-09-14 RX ORDER — OMEPRAZOLE 40 MG/1
CAPSULE, DELAYED RELEASE ORAL
Qty: 30 CAPSULE | Refills: 0 | Status: SHIPPED | OUTPATIENT
Start: 2020-09-14 | End: 2020-10-08

## 2020-09-14 NOTE — TELEPHONE ENCOUNTER
Please inform the patient that I refilled the prescription for prilosec and patient is due for a follow-up visit for continued evaluation and management. Similar message sent in 3/2020 with refill.  Thanks,  JASIEL

## 2020-09-14 NOTE — TELEPHONE ENCOUNTER
Informed pt of refill per Capri Yoon NP. Pt verbalized understanding and stated he would call back to schedule.

## 2020-10-07 DIAGNOSIS — R12 HEARTBURN: ICD-10-CM

## 2020-10-08 RX ORDER — OMEPRAZOLE 40 MG/1
CAPSULE, DELAYED RELEASE ORAL
Qty: 30 CAPSULE | Refills: 0 | Status: SHIPPED | OUTPATIENT
Start: 2020-10-08

## 2020-10-08 NOTE — TELEPHONE ENCOUNTER
Noted. Prescription was previously sent to pharmacy. You can call the pharmacy and inform them to cancel the refill  KTP

## 2020-10-08 NOTE — TELEPHONE ENCOUNTER
Please inform the patient that I refilled the prescription for prilosec and patient is due for a follow-up visit for continued evaluation and management. Similar messages sent in 3/2020 & 9/2020 with refills.  Thanks,  JASIEL

## 2020-10-08 NOTE — TELEPHONE ENCOUNTER
Spoke with pt. Informed he would need office visit for continued medication management & evaluation. Pt states he is out of town & will call clinic if he needs appointment & to cancel refill request.

## 2020-12-01 ENCOUNTER — PATIENT OUTREACH (OUTPATIENT)
Dept: ADMINISTRATIVE | Facility: HOSPITAL | Age: 55
End: 2020-12-01

## 2020-12-04 ENCOUNTER — OFFICE VISIT (OUTPATIENT)
Dept: FAMILY MEDICINE | Facility: CLINIC | Age: 55
End: 2020-12-04
Payer: MEDICARE

## 2020-12-04 VITALS
TEMPERATURE: 98 F | OXYGEN SATURATION: 98 % | HEART RATE: 81 BPM | DIASTOLIC BLOOD PRESSURE: 80 MMHG | SYSTOLIC BLOOD PRESSURE: 128 MMHG | BODY MASS INDEX: 28.79 KG/M2 | HEIGHT: 66 IN | WEIGHT: 179.13 LBS

## 2020-12-04 DIAGNOSIS — Z20.822 EXPOSURE TO COVID-19 VIRUS: Primary | ICD-10-CM

## 2020-12-04 DIAGNOSIS — E78.49 OTHER HYPERLIPIDEMIA: ICD-10-CM

## 2020-12-04 DIAGNOSIS — I10 ESSENTIAL HYPERTENSION: ICD-10-CM

## 2020-12-04 PROCEDURE — 1125F PR PAIN SEVERITY QUANTIFIED, PAIN PRESENT: ICD-10-PCS | Mod: S$GLB,,, | Performed by: INTERNAL MEDICINE

## 2020-12-04 PROCEDURE — 3079F PR MOST RECENT DIASTOLIC BLOOD PRESSURE 80-89 MM HG: ICD-10-PCS | Mod: S$GLB,,, | Performed by: INTERNAL MEDICINE

## 2020-12-04 PROCEDURE — 3008F PR BODY MASS INDEX (BMI) DOCUMENTED: ICD-10-PCS | Mod: S$GLB,,, | Performed by: INTERNAL MEDICINE

## 2020-12-04 PROCEDURE — 3074F PR MOST RECENT SYSTOLIC BLOOD PRESSURE < 130 MM HG: ICD-10-PCS | Mod: S$GLB,,, | Performed by: INTERNAL MEDICINE

## 2020-12-04 PROCEDURE — 99213 PR OFFICE/OUTPT VISIT, EST, LEVL III, 20-29 MIN: ICD-10-PCS | Mod: S$GLB,,, | Performed by: INTERNAL MEDICINE

## 2020-12-04 PROCEDURE — 99999 PR PBB SHADOW E&M-EST. PATIENT-LVL IV: CPT | Mod: PBBFAC,,, | Performed by: INTERNAL MEDICINE

## 2020-12-04 PROCEDURE — 3079F DIAST BP 80-89 MM HG: CPT | Mod: S$GLB,,, | Performed by: INTERNAL MEDICINE

## 2020-12-04 PROCEDURE — 3074F SYST BP LT 130 MM HG: CPT | Mod: S$GLB,,, | Performed by: INTERNAL MEDICINE

## 2020-12-04 PROCEDURE — 1125F AMNT PAIN NOTED PAIN PRSNT: CPT | Mod: S$GLB,,, | Performed by: INTERNAL MEDICINE

## 2020-12-04 PROCEDURE — 99213 OFFICE O/P EST LOW 20 MIN: CPT | Mod: S$GLB,,, | Performed by: INTERNAL MEDICINE

## 2020-12-04 PROCEDURE — 3008F BODY MASS INDEX DOCD: CPT | Mod: S$GLB,,, | Performed by: INTERNAL MEDICINE

## 2020-12-04 PROCEDURE — 99999 PR PBB SHADOW E&M-EST. PATIENT-LVL IV: ICD-10-PCS | Mod: PBBFAC,,, | Performed by: INTERNAL MEDICINE

## 2020-12-04 NOTE — PROGRESS NOTES
Subjective:       Patient ID: Maury Mullins is a 55 y.o. male.    Chief Complaint: Follow-up      Social Hx:  From [place]. . His sister lives in the area. Living in [place]. 2 grown children. 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     PMH:  Past medical history includes hypertension, hyperlipidemia, GERD, chronic low back pain with bilateral sciatica.    HPI:   F/u. He is doing well overall. He is still having back pain and Dr. Olson in [place]. Will traveling to [place] on dec 21st. Will need COVID test. Walking for exercise.     A/P:   COVID screen before travel.   HTN controlled.   Lipids controlled in jan 2020.     Health Maintenance:         Current Outpatient Medications on File Prior to Visit   Medication Sig Dispense Refill    AFLURIA QD 2019-20,3YR UP,,PF, 60 mcg (15 mcg x 4)/0.5 mL Syrg       atorvastatin (LIPITOR) 10 MG tablet TAKE 1 TABLET BY MOUTH EVERY DAY 90 tablet 1    BENEFIBER, WHEAT DEXTRIN, ORAL Take by mouth once daily.      cyclobenzaprine (FLEXERIL) 5 MG tablet       lisinopriL (PRINIVIL,ZESTRIL) 20 MG tablet TAKE 1 TABLET BY MOUTH EVERY DAY 90 tablet 3    meloxicam (MOBIC) 15 MG tablet Take 1 tablet (15 mg total) by mouth every other day. 15 tablet 0    omeprazole (PRILOSEC) 40 MG capsule TAKE 1 CAPSULE BY MOUTH EVERY DAY IN THE MORNING 30 capsule 0    pregabalin (LYRICA) 150 MG capsule Take 150 mg by mouth 2 (two) times daily.      traMADol (ULTRAM-ER) 100 MG Tb24 Take 100 mg by mouth once daily.      [DISCONTINUED] atorvastatin (LIPITOR) 10 MG tablet TAKE 1 TABLET BY MOUTH EVERY DAY 90 tablet 1     No current facility-administered medications on file prior to visit.      I personally reviewed past medical, family and social history.  Review of Systems   Constitutional: Negative for activity change and fever.   HENT: Negative for sore throat and trouble swallowing.    Eyes: Negative for pain and visual disturbance.    Respiratory: Negative for cough, shortness of breath and wheezing.    Cardiovascular: Negative for chest pain, palpitations and leg swelling.   Gastrointestinal: Negative for abdominal pain, blood in stool, diarrhea, nausea and vomiting.   Endocrine: Negative for cold intolerance and polyuria.   Genitourinary: Negative for decreased urine volume and dysuria.   Musculoskeletal: Positive for back pain. Negative for gait problem and neck pain.   Skin: Negative for rash.   Neurological: Negative for dizziness, syncope and light-headedness.   Psychiatric/Behavioral: Negative for dysphoric mood. The patient is not nervous/anxious.          Objective:     Vitals:    12/04/20 0939   BP: 128/80   Pulse: 81   Temp: 98 °F (36.7 °C)        Physical Exam  Constitutional:       General: He is not in acute distress.     Appearance: He is well-developed.   HENT:      Head: Normocephalic and atraumatic.   Eyes:      Pupils: Pupils are equal, round, and reactive to light.   Neck:      Musculoskeletal: Neck supple.      Thyroid: No thyromegaly.   Cardiovascular:      Rate and Rhythm: Normal rate and regular rhythm.      Heart sounds: Normal heart sounds. No murmur. No friction rub. No gallop.    Pulmonary:      Effort: Pulmonary effort is normal. No respiratory distress.      Breath sounds: Normal breath sounds. No wheezing.   Abdominal:      General: Bowel sounds are normal.      Palpations: Abdomen is soft.      Tenderness: There is no abdominal tenderness.   Skin:     General: Skin is warm.      Findings: No rash.   Neurological:      Mental Status: He is alert and oriented to person, place, and time.      Cranial Nerves: No cranial nerve deficit.   Psychiatric:         Behavior: Behavior normal.           Assessment/Plan   Maury was seen today for follow-up.    Diagnoses and all orders for this visit:    Exposure to COVID-19 virus  -     COVID-19 Routine Screening; Future    Essential hypertension  -     Basic Metabolic  Panel; Future    Other hyperlipidemia

## 2020-12-18 ENCOUNTER — CLINICAL SUPPORT (OUTPATIENT)
Dept: URGENT CARE | Facility: CLINIC | Age: 55
End: 2020-12-18
Payer: MEDICARE

## 2020-12-18 DIAGNOSIS — Z20.822 ENCOUNTER FOR LABORATORY TESTING FOR COVID-19 VIRUS: Primary | ICD-10-CM

## 2020-12-18 LAB
CTP QC/QA: YES
SARS-COV-2 RDRP RESP QL NAA+PROBE: NEGATIVE

## 2020-12-18 PROCEDURE — U0002: ICD-10-PCS | Mod: QW,S$GLB,, | Performed by: FAMILY MEDICINE

## 2020-12-18 PROCEDURE — U0002 COVID-19 LAB TEST NON-CDC: HCPCS | Mod: QW,S$GLB,, | Performed by: FAMILY MEDICINE

## 2021-01-08 ENCOUNTER — LAB VISIT (OUTPATIENT)
Dept: LAB | Facility: HOSPITAL | Age: 56
End: 2021-01-08
Attending: INTERNAL MEDICINE
Payer: MEDICARE

## 2021-01-08 DIAGNOSIS — I10 ESSENTIAL HYPERTENSION: ICD-10-CM

## 2021-01-08 LAB
ANION GAP SERPL CALC-SCNC: 7 MMOL/L (ref 8–16)
BUN SERPL-MCNC: 16 MG/DL (ref 6–20)
CALCIUM SERPL-MCNC: 9.3 MG/DL (ref 8.7–10.5)
CHLORIDE SERPL-SCNC: 102 MMOL/L (ref 95–110)
CO2 SERPL-SCNC: 28 MMOL/L (ref 23–29)
CREAT SERPL-MCNC: 1.1 MG/DL (ref 0.5–1.4)
EST. GFR  (AFRICAN AMERICAN): >60 ML/MIN/1.73 M^2
EST. GFR  (NON AFRICAN AMERICAN): >60 ML/MIN/1.73 M^2
GLUCOSE SERPL-MCNC: 133 MG/DL (ref 70–110)
POTASSIUM SERPL-SCNC: 3.9 MMOL/L (ref 3.5–5.1)
SODIUM SERPL-SCNC: 137 MMOL/L (ref 136–145)

## 2021-01-08 PROCEDURE — 80048 BASIC METABOLIC PNL TOTAL CA: CPT

## 2021-01-08 PROCEDURE — 36415 COLL VENOUS BLD VENIPUNCTURE: CPT | Mod: PO

## 2021-04-21 ENCOUNTER — PATIENT MESSAGE (OUTPATIENT)
Dept: FAMILY MEDICINE | Facility: CLINIC | Age: 56
End: 2021-04-21

## 2021-04-21 DIAGNOSIS — I10 ESSENTIAL HYPERTENSION: Primary | ICD-10-CM

## 2021-04-21 DIAGNOSIS — E78.49 OTHER HYPERLIPIDEMIA: ICD-10-CM

## 2021-04-21 DIAGNOSIS — Z11.4 SCREENING FOR HIV (HUMAN IMMUNODEFICIENCY VIRUS): ICD-10-CM

## 2021-04-21 RX ORDER — ATORVASTATIN CALCIUM 10 MG/1
TABLET, FILM COATED ORAL
Qty: 90 TABLET | Refills: 1 | Status: SHIPPED | OUTPATIENT
Start: 2021-04-21 | End: 2021-10-13

## 2021-05-12 ENCOUNTER — PATIENT MESSAGE (OUTPATIENT)
Dept: RESEARCH | Facility: HOSPITAL | Age: 56
End: 2021-05-12

## 2021-10-12 DIAGNOSIS — E78.49 OTHER HYPERLIPIDEMIA: ICD-10-CM

## 2021-10-13 RX ORDER — ATORVASTATIN CALCIUM 10 MG/1
TABLET, FILM COATED ORAL
Qty: 90 TABLET | Refills: 0 | Status: SHIPPED | OUTPATIENT
Start: 2021-10-13 | End: 2021-11-26

## 2021-11-26 DIAGNOSIS — E78.49 OTHER HYPERLIPIDEMIA: ICD-10-CM

## 2021-11-26 RX ORDER — ATORVASTATIN CALCIUM 10 MG/1
TABLET, FILM COATED ORAL
Qty: 30 TABLET | Refills: 0 | Status: SHIPPED | OUTPATIENT
Start: 2021-11-26 | End: 2022-02-04 | Stop reason: SDUPTHER

## 2022-02-04 DIAGNOSIS — E78.49 OTHER HYPERLIPIDEMIA: ICD-10-CM

## 2022-02-04 DIAGNOSIS — I10 ESSENTIAL HYPERTENSION: Primary | ICD-10-CM

## 2022-02-04 DIAGNOSIS — Z11.4 SCREENING FOR HIV (HUMAN IMMUNODEFICIENCY VIRUS): ICD-10-CM

## 2022-02-04 DIAGNOSIS — Z12.5 SCREENING FOR PROSTATE CANCER: ICD-10-CM

## 2022-02-04 RX ORDER — ATORVASTATIN CALCIUM 10 MG/1
10 TABLET, FILM COATED ORAL DAILY
Qty: 30 TABLET | Refills: 0 | Status: SHIPPED | OUTPATIENT
Start: 2022-02-04 | End: 2022-04-25 | Stop reason: SDUPTHER

## 2022-02-04 RX ORDER — ATORVASTATIN CALCIUM 10 MG/1
TABLET, FILM COATED ORAL
Qty: 30 TABLET | Refills: 0 | OUTPATIENT
Start: 2022-02-04

## 2022-02-04 NOTE — TELEPHONE ENCOUNTER
No new care gaps identified.  Powered by [employer]. Reference number: [id number].   2/04/2022 9:53:32 AM CST

## 2022-03-02 DIAGNOSIS — E78.49 OTHER HYPERLIPIDEMIA: ICD-10-CM

## 2022-03-02 RX ORDER — ATORVASTATIN CALCIUM 10 MG/1
TABLET, FILM COATED ORAL
Qty: 30 TABLET | Refills: 0 | OUTPATIENT
Start: 2022-03-02

## 2022-03-02 NOTE — TELEPHONE ENCOUNTER
This Rx Request does not qualify for assessment with the OR   Please review protocol details and the Care Due Message for extra clinical information    Reasons Rx Request may be deferred:  Labs/Vitals overdue  Labs/Vitals abnormal  Patient has been seen in the ED/Hospital since the last PCP visit  Medication is non-delegated  Provider is a non-participating provide    Note composed:11:38 AM 03/02/2022

## 2022-03-02 NOTE — TELEPHONE ENCOUNTER
Care Due:                  Date            Visit Type   Department     Provider  --------------------------------------------------------------------------------                                ESTABLISHED   [facility]  Last Visit: 12-      PATIENT      MEDICINE       Cirilo Ruggiero  Next Visit: None Scheduled  None         None Found                                                            Last  Test          Frequency    Reason                     Performed    Due Date  --------------------------------------------------------------------------------    CMP.........  12 months..  atorvastatin, lisinopriL.  07- 07-    Powered by "[employer]. Reference number: [id number].   3/02/2022 7:32:05 AM CST

## 2022-03-04 NOTE — TELEPHONE ENCOUNTER
Called and left a message for patient to contact the office.  Patient need to schedule an appointment. Per provider

## 2022-03-04 NOTE — TELEPHONE ENCOUNTER
Provider Staff:     Action required for this patient.    Please note Refusal of medication.            Requested Prescriptions     Refused Prescriptions Disp Refills    atorvastatin (LIPITOR) 10 MG tablet [Pharmacy Med Name: ATORVASTATIN 10 MG TABLET] 30 tablet 0     Sig: TAKE 1 TABLET BY MOUTH EVERY DAY     Refused By: ROOSEVELT HOLMAN     Reason for Refusal: Patient needs an appointment      Thanks!  [facility]   Note composed: 03/04/2022 6:00 AM

## 2022-04-24 DIAGNOSIS — E78.49 OTHER HYPERLIPIDEMIA: ICD-10-CM

## 2022-04-24 NOTE — TELEPHONE ENCOUNTER
No new care gaps identified.  Powered by [employer]. Reference number: [id number].   4/24/2022 7:26:15 AM CDT

## 2022-04-25 RX ORDER — ATORVASTATIN CALCIUM 10 MG/1
TABLET, FILM COATED ORAL
Qty: 30 TABLET | Refills: 0 | Status: SHIPPED | OUTPATIENT
Start: 2022-04-25

## 2022-04-25 NOTE — TELEPHONE ENCOUNTER
Refill Routing Note   Medication(s) are not appropriate for processing by [facility] for the following reason(s):      - Patient has not been seen in over 15 months by PCP  - Required laboratory values are outdated    [facility] action(s):  Defer          Medication reconciliation completed: No     Appointments  past 12m or future 3m with PCP    Date Provider   Last Visit   12/4/2020 Cirilo Ruggiero, DO   Next Visit   Visit date not found Cirilo Ruggiero, DO   ED visits in past 90 days: 0        Note composed:3:16 PM 04/25/2022

## 2022-05-31 ENCOUNTER — PATIENT MESSAGE (OUTPATIENT)
Dept: ADMINISTRATIVE | Facility: HOSPITAL | Age: 57
End: 2022-05-31
Payer: MEDICARE
